# Patient Record
Sex: FEMALE | Race: WHITE | Employment: PART TIME | URBAN - NONMETROPOLITAN AREA
[De-identification: names, ages, dates, MRNs, and addresses within clinical notes are randomized per-mention and may not be internally consistent; named-entity substitution may affect disease eponyms.]

---

## 2017-02-06 RX ORDER — VALACYCLOVIR HYDROCHLORIDE 1 G/1
TABLET, FILM COATED ORAL
Qty: 30 TABLET | Refills: 0 | Status: SHIPPED | OUTPATIENT
Start: 2017-02-06 | End: 2017-03-09 | Stop reason: SDUPTHER

## 2017-03-09 RX ORDER — VALACYCLOVIR HYDROCHLORIDE 1 G/1
TABLET, FILM COATED ORAL
Qty: 30 TABLET | Refills: 0 | Status: SHIPPED | OUTPATIENT
Start: 2017-03-09 | End: 2017-06-13 | Stop reason: SDUPTHER

## 2017-06-13 ENCOUNTER — OFFICE VISIT (OUTPATIENT)
Dept: FAMILY MEDICINE CLINIC | Age: 47
End: 2017-06-13

## 2017-06-13 VITALS
OXYGEN SATURATION: 98 % | HEIGHT: 65 IN | HEART RATE: 90 BPM | DIASTOLIC BLOOD PRESSURE: 68 MMHG | WEIGHT: 191.8 LBS | BODY MASS INDEX: 31.96 KG/M2 | TEMPERATURE: 97.7 F | SYSTOLIC BLOOD PRESSURE: 98 MMHG

## 2017-06-13 DIAGNOSIS — F41.8 DEPRESSION WITH ANXIETY: Primary | ICD-10-CM

## 2017-06-13 DIAGNOSIS — Z12.39 BREAST CANCER SCREENING: ICD-10-CM

## 2017-06-13 DIAGNOSIS — F17.200 SMOKER: ICD-10-CM

## 2017-06-13 DIAGNOSIS — R63.5 WEIGHT GAIN: ICD-10-CM

## 2017-06-13 DIAGNOSIS — A60.00 GENITAL HERPES SIMPLEX, UNSPECIFIED SITE: ICD-10-CM

## 2017-06-13 DIAGNOSIS — F41.8 DEPRESSION WITH ANXIETY: ICD-10-CM

## 2017-06-13 LAB
ALT SERPL-CCNC: 32 U/L (ref 0–33)
ANION GAP SERPL CALCULATED.3IONS-SCNC: 17 MEQ/L (ref 7–13)
AST SERPL-CCNC: 33 U/L (ref 0–35)
BUN BLDV-MCNC: 11 MG/DL (ref 6–20)
CALCIUM SERPL-MCNC: 9.3 MG/DL (ref 8.6–10.2)
CHLORIDE BLD-SCNC: 98 MEQ/L (ref 98–107)
CO2: 20 MEQ/L (ref 22–29)
CREAT SERPL-MCNC: 0.48 MG/DL (ref 0.5–0.9)
GFR AFRICAN AMERICAN: >60
GFR NON-AFRICAN AMERICAN: >60
GLUCOSE BLD-MCNC: 71 MG/DL (ref 74–109)
HCT VFR BLD CALC: 44.7 % (ref 37–47)
HEMOGLOBIN: 14.7 G/DL (ref 12–16)
MCH RBC QN AUTO: 31.8 PG (ref 27–31.3)
MCHC RBC AUTO-ENTMCNC: 33 % (ref 33–37)
MCV RBC AUTO: 96.3 FL (ref 82–100)
PDW BLD-RTO: 14.3 % (ref 11.5–14.5)
PLATELET # BLD: 361 K/UL (ref 130–400)
POTASSIUM SERPL-SCNC: 4.5 MEQ/L (ref 3.5–5.1)
RBC # BLD: 4.64 M/UL (ref 4.2–5.4)
SODIUM BLD-SCNC: 135 MEQ/L (ref 132–144)
TSH SERPL DL<=0.05 MIU/L-ACNC: 1.28 UIU/ML (ref 0.27–4.2)
WBC # BLD: 7.1 K/UL (ref 4.8–10.8)

## 2017-06-13 PROCEDURE — 99203 OFFICE O/P NEW LOW 30 MIN: CPT | Performed by: NURSE PRACTITIONER

## 2017-06-13 RX ORDER — FLUOXETINE HYDROCHLORIDE 40 MG/1
CAPSULE ORAL
Qty: 60 CAPSULE | Refills: 5 | Status: SHIPPED | OUTPATIENT
Start: 2017-06-13 | End: 2017-07-11 | Stop reason: SDUPTHER

## 2017-06-13 RX ORDER — VALACYCLOVIR HYDROCHLORIDE 1 G/1
TABLET, FILM COATED ORAL
Qty: 30 TABLET | Refills: 5 | Status: SHIPPED | OUTPATIENT
Start: 2017-06-13 | End: 2017-07-11 | Stop reason: SDUPTHER

## 2017-06-13 RX ORDER — BUPROPION HYDROCHLORIDE 150 MG/1
150 TABLET, EXTENDED RELEASE ORAL 2 TIMES DAILY
Qty: 60 TABLET | Refills: 3 | Status: SHIPPED | OUTPATIENT
Start: 2017-06-13 | End: 2017-07-11 | Stop reason: SDUPTHER

## 2017-06-13 ASSESSMENT — ENCOUNTER SYMPTOMS
SHORTNESS OF BREATH: 0
COUGH: 0

## 2017-07-06 ENCOUNTER — HOSPITAL ENCOUNTER (OUTPATIENT)
Dept: WOMENS IMAGING | Age: 47
Discharge: HOME | End: 2017-07-06
Payer: COMMERCIAL

## 2017-07-06 DIAGNOSIS — Z12.39 BREAST CANCER SCREENING: ICD-10-CM

## 2017-07-06 PROCEDURE — G0202 SCR MAMMO BI INCL CAD: HCPCS

## 2017-07-11 ENCOUNTER — OFFICE VISIT (OUTPATIENT)
Dept: FAMILY MEDICINE CLINIC | Age: 47
End: 2017-07-11

## 2017-07-11 VITALS
OXYGEN SATURATION: 97 % | HEIGHT: 65 IN | DIASTOLIC BLOOD PRESSURE: 60 MMHG | BODY MASS INDEX: 31.32 KG/M2 | HEART RATE: 69 BPM | TEMPERATURE: 97.7 F | SYSTOLIC BLOOD PRESSURE: 98 MMHG | WEIGHT: 188 LBS

## 2017-07-11 DIAGNOSIS — F41.8 DEPRESSION WITH ANXIETY: ICD-10-CM

## 2017-07-11 DIAGNOSIS — F17.200 SMOKER: ICD-10-CM

## 2017-07-11 DIAGNOSIS — A60.00 GENITAL HERPES SIMPLEX, UNSPECIFIED SITE: ICD-10-CM

## 2017-07-11 PROCEDURE — 99213 OFFICE O/P EST LOW 20 MIN: CPT | Performed by: NURSE PRACTITIONER

## 2017-07-11 RX ORDER — VALACYCLOVIR HYDROCHLORIDE 1 G/1
TABLET, FILM COATED ORAL
Qty: 90 TABLET | Refills: 1 | Status: SHIPPED | OUTPATIENT
Start: 2017-07-11 | End: 2018-04-03 | Stop reason: SDUPTHER

## 2017-07-11 RX ORDER — BUPROPION HYDROCHLORIDE 150 MG/1
150 TABLET, EXTENDED RELEASE ORAL 2 TIMES DAILY
Qty: 180 TABLET | Refills: 1 | Status: SHIPPED | OUTPATIENT
Start: 2017-07-11 | End: 2018-01-24 | Stop reason: SDUPTHER

## 2017-07-11 RX ORDER — FLUOXETINE HYDROCHLORIDE 40 MG/1
CAPSULE ORAL
Qty: 180 CAPSULE | Refills: 1 | Status: SHIPPED | OUTPATIENT
Start: 2017-07-11 | End: 2018-01-24 | Stop reason: SDUPTHER

## 2017-07-11 ASSESSMENT — ENCOUNTER SYMPTOMS
SHORTNESS OF BREATH: 0
COUGH: 0

## 2018-01-24 DIAGNOSIS — F41.8 DEPRESSION WITH ANXIETY: ICD-10-CM

## 2018-01-24 DIAGNOSIS — F17.200 SMOKER: ICD-10-CM

## 2018-01-24 RX ORDER — BUPROPION HYDROCHLORIDE 150 MG/1
TABLET, EXTENDED RELEASE ORAL
Qty: 180 TABLET | Refills: 0 | Status: ON HOLD | OUTPATIENT
Start: 2018-01-24 | End: 2018-04-26

## 2018-01-24 RX ORDER — FLUOXETINE HYDROCHLORIDE 40 MG/1
CAPSULE ORAL
Qty: 180 CAPSULE | Refills: 0 | Status: SHIPPED | OUTPATIENT
Start: 2018-01-24 | End: 2018-04-03 | Stop reason: SDUPTHER

## 2018-04-03 ENCOUNTER — OFFICE VISIT (OUTPATIENT)
Dept: FAMILY MEDICINE CLINIC | Age: 48
End: 2018-04-03
Payer: COMMERCIAL

## 2018-04-03 VITALS
HEIGHT: 66 IN | RESPIRATION RATE: 14 BRPM | HEART RATE: 84 BPM | SYSTOLIC BLOOD PRESSURE: 118 MMHG | TEMPERATURE: 98.1 F | DIASTOLIC BLOOD PRESSURE: 72 MMHG | WEIGHT: 193 LBS | BODY MASS INDEX: 31.02 KG/M2

## 2018-04-03 DIAGNOSIS — A60.00 GENITAL HERPES SIMPLEX, UNSPECIFIED SITE: ICD-10-CM

## 2018-04-03 DIAGNOSIS — F41.8 DEPRESSION WITH ANXIETY: Primary | ICD-10-CM

## 2018-04-03 DIAGNOSIS — Z80.0 FAMILY HISTORY OF COLON CANCER REQUIRING SCREENING COLONOSCOPY: ICD-10-CM

## 2018-04-03 PROCEDURE — 99213 OFFICE O/P EST LOW 20 MIN: CPT | Performed by: FAMILY MEDICINE

## 2018-04-03 RX ORDER — VALACYCLOVIR HYDROCHLORIDE 1 G/1
TABLET, FILM COATED ORAL
Qty: 30 TABLET | Refills: 5 | Status: SHIPPED | OUTPATIENT
Start: 2018-04-03 | End: 2018-09-26 | Stop reason: SDUPTHER

## 2018-04-03 RX ORDER — FLUOXETINE HYDROCHLORIDE 40 MG/1
CAPSULE ORAL
Qty: 60 CAPSULE | Refills: 5 | Status: ON HOLD | OUTPATIENT
Start: 2018-04-03 | End: 2018-04-26

## 2018-04-03 RX ORDER — BUPROPION HYDROCHLORIDE 150 MG/1
TABLET, EXTENDED RELEASE ORAL
Qty: 60 TABLET | Refills: 5 | Status: CANCELLED | OUTPATIENT
Start: 2018-04-03

## 2018-04-03 ASSESSMENT — ENCOUNTER SYMPTOMS
ABDOMINAL PAIN: 0
SHORTNESS OF BREATH: 0

## 2018-04-09 ENCOUNTER — TELEPHONE (OUTPATIENT)
Dept: ENDOSCOPY | Age: 48
End: 2018-04-09

## 2018-04-25 ENCOUNTER — ANESTHESIA EVENT (OUTPATIENT)
Dept: ENDOSCOPY | Age: 48
End: 2018-04-25
Payer: COMMERCIAL

## 2018-04-26 ENCOUNTER — HOSPITAL ENCOUNTER (OUTPATIENT)
Age: 48
Setting detail: OUTPATIENT SURGERY
Discharge: HOME | End: 2018-04-26
Attending: SPECIALIST | Admitting: SPECIALIST
Payer: COMMERCIAL

## 2018-04-26 ENCOUNTER — ANESTHESIA (OUTPATIENT)
Dept: ENDOSCOPY | Age: 48
End: 2018-04-26
Payer: COMMERCIAL

## 2018-04-26 VITALS
DIASTOLIC BLOOD PRESSURE: 58 MMHG | SYSTOLIC BLOOD PRESSURE: 103 MMHG | RESPIRATION RATE: 7 BRPM | OXYGEN SATURATION: 97 %

## 2018-04-26 VITALS
HEART RATE: 55 BPM | SYSTOLIC BLOOD PRESSURE: 100 MMHG | WEIGHT: 185 LBS | BODY MASS INDEX: 30.82 KG/M2 | TEMPERATURE: 96.9 F | RESPIRATION RATE: 16 BRPM | HEIGHT: 65 IN | OXYGEN SATURATION: 99 % | DIASTOLIC BLOOD PRESSURE: 71 MMHG

## 2018-04-26 PROCEDURE — 3700000000 HC ANESTHESIA ATTENDED CARE: Performed by: SPECIALIST

## 2018-04-26 PROCEDURE — 3609027000 HC COLONOSCOPY: Performed by: SPECIALIST

## 2018-04-26 PROCEDURE — 7100000010 HC PHASE II RECOVERY - FIRST 15 MIN: Performed by: SPECIALIST

## 2018-04-26 PROCEDURE — 6360000002 HC RX W HCPCS: Performed by: NURSE ANESTHETIST, CERTIFIED REGISTERED

## 2018-04-26 PROCEDURE — 7100000011 HC PHASE II RECOVERY - ADDTL 15 MIN: Performed by: SPECIALIST

## 2018-04-26 PROCEDURE — 3700000001 HC ADD 15 MINUTES (ANESTHESIA): Performed by: SPECIALIST

## 2018-04-26 RX ORDER — PROPOFOL 10 MG/ML
INJECTION, EMULSION INTRAVENOUS PRN
Status: DISCONTINUED | OUTPATIENT
Start: 2018-04-26 | End: 2018-04-26 | Stop reason: SDUPTHER

## 2018-04-26 RX ORDER — VENLAFAXINE HYDROCHLORIDE 150 MG/1
150 CAPSULE, EXTENDED RELEASE ORAL DAILY
COMMUNITY
End: 2018-10-19 | Stop reason: SDUPTHER

## 2018-04-26 RX ORDER — SODIUM CHLORIDE 0.9 % (FLUSH) 0.9 %
10 SYRINGE (ML) INJECTION EVERY 12 HOURS SCHEDULED
Status: DISCONTINUED | OUTPATIENT
Start: 2018-04-26 | End: 2018-04-26 | Stop reason: HOSPADM

## 2018-04-26 RX ORDER — ONDANSETRON 2 MG/ML
4 INJECTION INTRAMUSCULAR; INTRAVENOUS
Status: DISCONTINUED | OUTPATIENT
Start: 2018-04-26 | End: 2018-04-26 | Stop reason: HOSPADM

## 2018-04-26 RX ORDER — SODIUM CHLORIDE 0.9 % (FLUSH) 0.9 %
10 SYRINGE (ML) INJECTION PRN
Status: DISCONTINUED | OUTPATIENT
Start: 2018-04-26 | End: 2018-04-26 | Stop reason: HOSPADM

## 2018-04-26 RX ORDER — SODIUM CHLORIDE 9 MG/ML
INJECTION, SOLUTION INTRAVENOUS CONTINUOUS
Status: DISCONTINUED | OUTPATIENT
Start: 2018-04-26 | End: 2018-04-26 | Stop reason: HOSPADM

## 2018-04-26 RX ORDER — TRAZODONE HYDROCHLORIDE 50 MG/1
50 TABLET ORAL DAILY PRN
COMMUNITY
End: 2020-04-28 | Stop reason: CLARIF

## 2018-04-26 RX ORDER — HYDROXYZINE PAMOATE 50 MG/1
50 CAPSULE ORAL 2 TIMES DAILY PRN
COMMUNITY
End: 2018-11-16 | Stop reason: SDUPTHER

## 2018-04-26 RX ORDER — LIDOCAINE HYDROCHLORIDE 10 MG/ML
1 INJECTION, SOLUTION EPIDURAL; INFILTRATION; INTRACAUDAL; PERINEURAL
Status: DISCONTINUED | OUTPATIENT
Start: 2018-04-26 | End: 2018-04-26 | Stop reason: HOSPADM

## 2018-04-26 RX ADMIN — PROPOFOL 50 MG: 10 INJECTION, EMULSION INTRAVENOUS at 07:12

## 2018-04-26 RX ADMIN — PROPOFOL 20 MG: 10 INJECTION, EMULSION INTRAVENOUS at 07:26

## 2018-04-26 RX ADMIN — PROPOFOL 50 MG: 10 INJECTION, EMULSION INTRAVENOUS at 07:07

## 2018-04-26 RX ADMIN — PROPOFOL 50 MG: 10 INJECTION, EMULSION INTRAVENOUS at 07:06

## 2018-04-26 RX ADMIN — PROPOFOL 50 MG: 10 INJECTION, EMULSION INTRAVENOUS at 07:18

## 2018-04-26 RX ADMIN — PROPOFOL 50 MG: 10 INJECTION, EMULSION INTRAVENOUS at 07:09

## 2018-04-26 RX ADMIN — PROPOFOL 10 MG: 10 INJECTION, EMULSION INTRAVENOUS at 07:24

## 2018-04-26 RX ADMIN — PROPOFOL 20 MG: 10 INJECTION, EMULSION INTRAVENOUS at 07:23

## 2018-04-26 RX ADMIN — PROPOFOL 50 MG: 10 INJECTION, EMULSION INTRAVENOUS at 07:16

## 2018-04-26 RX ADMIN — PROPOFOL 50 MG: 10 INJECTION, EMULSION INTRAVENOUS at 07:21

## 2018-09-21 ENCOUNTER — OFFICE VISIT (OUTPATIENT)
Dept: FAMILY MEDICINE CLINIC | Age: 48
End: 2018-09-21
Payer: COMMERCIAL

## 2018-09-21 VITALS
RESPIRATION RATE: 12 BRPM | DIASTOLIC BLOOD PRESSURE: 82 MMHG | HEART RATE: 60 BPM | BODY MASS INDEX: 32.52 KG/M2 | TEMPERATURE: 97.3 F | WEIGHT: 195.2 LBS | SYSTOLIC BLOOD PRESSURE: 120 MMHG | HEIGHT: 65 IN

## 2018-09-21 DIAGNOSIS — N92.6 IRREGULAR PERIODS: ICD-10-CM

## 2018-09-21 DIAGNOSIS — Z23 NEED FOR INFLUENZA VACCINATION: ICD-10-CM

## 2018-09-21 DIAGNOSIS — F32.A DEPRESSION, UNSPECIFIED DEPRESSION TYPE: ICD-10-CM

## 2018-09-21 DIAGNOSIS — Z12.31 VISIT FOR SCREENING MAMMOGRAM: ICD-10-CM

## 2018-09-21 DIAGNOSIS — Z00.00 ROUTINE GENERAL MEDICAL EXAMINATION AT A HEALTH CARE FACILITY: Primary | ICD-10-CM

## 2018-09-21 DIAGNOSIS — E66.09 OBESITY DUE TO EXCESS CALORIES, UNSPECIFIED CLASSIFICATION, UNSPECIFIED WHETHER SERIOUS COMORBIDITY PRESENT: ICD-10-CM

## 2018-09-21 DIAGNOSIS — A60.00 GENITAL HERPES SIMPLEX, UNSPECIFIED SITE: ICD-10-CM

## 2018-09-21 PROCEDURE — 99396 PREV VISIT EST AGE 40-64: CPT | Performed by: FAMILY MEDICINE

## 2018-09-21 PROCEDURE — 90688 IIV4 VACCINE SPLT 0.5 ML IM: CPT | Performed by: FAMILY MEDICINE

## 2018-09-21 PROCEDURE — 90471 IMMUNIZATION ADMIN: CPT | Performed by: FAMILY MEDICINE

## 2018-09-21 RX ORDER — PHENTERMINE HYDROCHLORIDE 37.5 MG/1
37.5 TABLET ORAL
Qty: 30 TABLET | Refills: 0 | Status: SHIPPED | OUTPATIENT
Start: 2018-09-21 | End: 2018-10-19 | Stop reason: SDUPTHER

## 2018-09-21 ASSESSMENT — PATIENT HEALTH QUESTIONNAIRE - PHQ9
SUM OF ALL RESPONSES TO PHQ QUESTIONS 1-9: 2
2. FEELING DOWN, DEPRESSED OR HOPELESS: 1
1. LITTLE INTEREST OR PLEASURE IN DOING THINGS: 1
SUM OF ALL RESPONSES TO PHQ QUESTIONS 1-9: 2
SUM OF ALL RESPONSES TO PHQ9 QUESTIONS 1 & 2: 2

## 2018-09-21 ASSESSMENT — ENCOUNTER SYMPTOMS
ABDOMINAL PAIN: 0
COUGH: 0
CONSTIPATION: 0
NAUSEA: 0
EYES NEGATIVE: 1
SHORTNESS OF BREATH: 0
DIARRHEA: 0

## 2018-09-21 NOTE — PROGRESS NOTES
Spouse name: Nadya Harvey    Number of children: N/A    Years of education: N/A     Occupational History     [employer]      Social History Main Topics    Smoking status: Former Smoker     Packs/day: 0.50     Years: 30.00     Types: Cigarettes     Start date: 1/1/1987     Quit date: 6/24/2017    Smokeless tobacco: Never Used    Alcohol use No      Comment: occasional    Drug use: No    Sexual activity: Not on file     Other Topics Concern    Not on file     Social History Narrative    No narrative on file     Family History   Problem Relation Age of Onset    Depression Mother     Cancer Mother         COLON    Colon Cancer Mother     Heart Attack Paternal Grandmother     Colon Cancer Paternal Grandfather      No Known Allergies  Current Outpatient Prescriptions   Medication Sig Dispense Refill    phentermine (ADIPEX-P) 37.5 MG tablet Take 1 tablet by mouth every morning (before breakfast) for 30 days. . 30 tablet 0    venlafaxine (EFFEXOR XR) 150 MG extended release capsule Take 150 mg by mouth daily      hydrOXYzine (VISTARIL) 50 MG capsule Take 50 mg by mouth 2 times daily as needed for Itching      traZODone (DESYREL) 50 MG tablet Take 50 mg by mouth daily as needed for Sleep      Multiple Vitamins-Minerals (MULTI ADULT GUMMIES PO) Take 1 tablet by mouth daily      valACYclovir (VALTREX) 1 g tablet TAKE 1 TABLET BY MOUTH ONCE DAILY 30 tablet 5     No current facility-administered medications for this visit. PMH, Surgical Hx, Family Hx, and Social Hx reviewed and updated. Health Maintenance reviewed. Objective    Vitals:    09/21/18 1339   BP: 120/82   Pulse: 60   Resp: 12   Temp: 97.3 °F (36.3 °C)   TempSrc: Temporal   Weight: 195 lb 3.2 oz (88.5 kg)   Height: 5' 5\" (1.651 m)     Physical Exam   Constitutional: She appears well-nourished. HENT:   Nose: Nose normal.   Mouth/Throat: Oropharynx is clear and moist.   Eyes: Pupils are equal, round, and reactive to light. Conjunctivae are normal.   Neck: Neck supple. No thyromegaly present. Cardiovascular: Normal rate, regular rhythm and normal heart sounds. No murmur heard. Pulmonary/Chest: Breath sounds normal. She has no wheezes. Abdominal: Soft. She exhibits no mass. There is no tenderness. Musculoskeletal: She exhibits no edema. Lymphadenopathy:     She has no cervical adenopathy. Neurological: She is alert. Skin: No rash noted. Psychiatric: She has a normal mood and affect. Assessment & Plan    Diagnosis Orders   1. Routine general medical examination at a health care facility  CBC With Auto Differential    Comprehensive Metabolic Panel    Lipid Panel   2. Depression, unspecified depression type     3. Genital herpes simplex, unspecified site     4. Need for influenza vaccination  INFLUENZA, QUADV, 3 YRS AND OLDER, IM, MDV, 0.5ML ([place] Patient)   5. Visit for screening mammogram  BONI DIGITAL SCREEN W CAD BILATERAL   6. Irregular periods  Ambulatory referral to Obstetrics / Gynecology   7.  Obesity due to excess calories, unspecified classification, unspecified whether serious comorbidity present  phentermine (ADIPEX-P) 37.5 MG tablet     Orders Placed This Encounter   Procedures    BONI DIGITAL SCREEN W CAD BILATERAL     Standing Status:   Future     Standing Expiration Date:   10/11/2019    INFLUENZA, QUADV, 3 YRS AND OLDER, IM, MDV, 0.5ML (FLUZONE QUADV)    CBC With Auto Differential     Standing Status:   Future     Standing Expiration Date:   9/21/2019    Comprehensive Metabolic Panel     Standing Status:   Future     Standing Expiration Date:   9/21/2019    Lipid Panel     Standing Status:   Future     Standing Expiration Date:   9/21/2019     Order Specific Question:   Is Patient Fasting?/# of Hours     Answer:   8    Ambulatory referral to Obstetrics / Gynecology     Referral Priority:   Routine     Referral Type:   Consult for Advice and Opinion     Referral Reason:   Specialty Services

## 2018-09-26 DIAGNOSIS — A60.00 GENITAL HERPES SIMPLEX, UNSPECIFIED SITE: ICD-10-CM

## 2018-09-26 RX ORDER — VALACYCLOVIR HYDROCHLORIDE 1 G/1
TABLET, FILM COATED ORAL
Qty: 30 TABLET | Refills: 5 | Status: SHIPPED | OUTPATIENT
Start: 2018-09-26 | End: 2019-04-03 | Stop reason: SDUPTHER

## 2018-10-19 ENCOUNTER — OFFICE VISIT (OUTPATIENT)
Dept: FAMILY MEDICINE CLINIC | Age: 48
End: 2018-10-19
Payer: COMMERCIAL

## 2018-10-19 VITALS
HEIGHT: 65 IN | RESPIRATION RATE: 12 BRPM | BODY MASS INDEX: 31.42 KG/M2 | WEIGHT: 188.6 LBS | DIASTOLIC BLOOD PRESSURE: 88 MMHG | TEMPERATURE: 97.1 F | SYSTOLIC BLOOD PRESSURE: 126 MMHG | HEART RATE: 52 BPM

## 2018-10-19 DIAGNOSIS — M50.30 DDD (DEGENERATIVE DISC DISEASE), CERVICAL: ICD-10-CM

## 2018-10-19 DIAGNOSIS — E66.09 OBESITY DUE TO EXCESS CALORIES, UNSPECIFIED CLASSIFICATION, UNSPECIFIED WHETHER SERIOUS COMORBIDITY PRESENT: Primary | ICD-10-CM

## 2018-10-19 DIAGNOSIS — F32.A DEPRESSION, UNSPECIFIED DEPRESSION TYPE: ICD-10-CM

## 2018-10-19 PROCEDURE — 99213 OFFICE O/P EST LOW 20 MIN: CPT | Performed by: FAMILY MEDICINE

## 2018-10-19 RX ORDER — PHENTERMINE HYDROCHLORIDE 37.5 MG/1
37.5 TABLET ORAL
Qty: 30 TABLET | Refills: 0 | Status: SHIPPED | OUTPATIENT
Start: 2018-10-19 | End: 2018-11-16 | Stop reason: SDUPTHER

## 2018-10-19 RX ORDER — VENLAFAXINE HYDROCHLORIDE 150 MG/1
150 CAPSULE, EXTENDED RELEASE ORAL DAILY
Qty: 30 CAPSULE | Refills: 5 | Status: SHIPPED | OUTPATIENT
Start: 2018-10-19 | End: 2018-11-16 | Stop reason: SDUPTHER

## 2018-10-19 ASSESSMENT — ENCOUNTER SYMPTOMS
CONSTIPATION: 0
NAUSEA: 0
COUGH: 0
EYES NEGATIVE: 1
ABDOMINAL PAIN: 0
SHORTNESS OF BREATH: 0
DIARRHEA: 0

## 2018-11-16 ENCOUNTER — OFFICE VISIT (OUTPATIENT)
Dept: FAMILY MEDICINE CLINIC | Age: 48
End: 2018-11-16
Payer: COMMERCIAL

## 2018-11-16 VITALS
WEIGHT: 183 LBS | BODY MASS INDEX: 30.49 KG/M2 | HEIGHT: 65 IN | TEMPERATURE: 96.3 F | SYSTOLIC BLOOD PRESSURE: 120 MMHG | HEART RATE: 76 BPM | RESPIRATION RATE: 12 BRPM | DIASTOLIC BLOOD PRESSURE: 78 MMHG

## 2018-11-16 DIAGNOSIS — Z00.00 ROUTINE GENERAL MEDICAL EXAMINATION AT A HEALTH CARE FACILITY: ICD-10-CM

## 2018-11-16 DIAGNOSIS — M50.30 DDD (DEGENERATIVE DISC DISEASE), CERVICAL: Primary | ICD-10-CM

## 2018-11-16 DIAGNOSIS — E66.09 OBESITY DUE TO EXCESS CALORIES, UNSPECIFIED CLASSIFICATION, UNSPECIFIED WHETHER SERIOUS COMORBIDITY PRESENT: ICD-10-CM

## 2018-11-16 DIAGNOSIS — F32.A MILD DEPRESSION: ICD-10-CM

## 2018-11-16 LAB
ANION GAP SERPL CALCULATED.3IONS-SCNC: 11 MEQ/L (ref 7–13)
BASOPHILS ABSOLUTE: 0.1 K/UL (ref 0–0.2)
BASOPHILS RELATIVE PERCENT: 1.1 %
BUN BLDV-MCNC: 17 MG/DL (ref 6–20)
CALCIUM SERPL-MCNC: 10.2 MG/DL (ref 8.6–10.2)
CHLORIDE BLD-SCNC: 107 MEQ/L (ref 98–107)
CHOLESTEROL, TOTAL: 127 MG/DL (ref 0–199)
CO2: 26 MEQ/L (ref 22–29)
CREAT SERPL-MCNC: 0.62 MG/DL (ref 0.5–0.9)
EOSINOPHILS ABSOLUTE: 0.1 K/UL (ref 0–0.7)
EOSINOPHILS RELATIVE PERCENT: 2.1 %
GFR AFRICAN AMERICAN: >60
GFR NON-AFRICAN AMERICAN: >60
GLUCOSE BLD-MCNC: 104 MG/DL (ref 74–109)
HCT VFR BLD CALC: 42.7 % (ref 37–47)
HDLC SERPL-MCNC: 43 MG/DL (ref 40–59)
HEMOGLOBIN: 14.8 G/DL (ref 12–16)
LDL CHOLESTEROL CALCULATED: 74 MG/DL (ref 0–129)
LYMPHOCYTES ABSOLUTE: 1.5 K/UL (ref 1–4.8)
LYMPHOCYTES RELATIVE PERCENT: 22.6 %
MCH RBC QN AUTO: 33.3 PG (ref 27–31.3)
MCHC RBC AUTO-ENTMCNC: 34.7 % (ref 33–37)
MCV RBC AUTO: 95.9 FL (ref 82–100)
MONOCYTES ABSOLUTE: 0.5 K/UL (ref 0.2–0.8)
MONOCYTES RELATIVE PERCENT: 7.1 %
NEUTROPHILS ABSOLUTE: 4.3 K/UL (ref 1.4–6.5)
NEUTROPHILS RELATIVE PERCENT: 67.1 %
PDW BLD-RTO: 13.5 % (ref 11.5–14.5)
PLATELET # BLD: 338 K/UL (ref 130–400)
POTASSIUM SERPL-SCNC: 4.4 MEQ/L (ref 3.5–5.1)
RBC # BLD: 4.46 M/UL (ref 4.2–5.4)
SODIUM BLD-SCNC: 144 MEQ/L (ref 132–144)
TRIGL SERPL-MCNC: 48 MG/DL (ref 0–200)
WBC # BLD: 6.4 K/UL (ref 4.8–10.8)

## 2018-11-16 PROCEDURE — 99213 OFFICE O/P EST LOW 20 MIN: CPT | Performed by: FAMILY MEDICINE

## 2018-11-16 RX ORDER — HYDROXYZINE PAMOATE 50 MG/1
50 CAPSULE ORAL 2 TIMES DAILY PRN
Qty: 60 CAPSULE | Refills: 5 | Status: SHIPPED | OUTPATIENT
Start: 2018-11-16 | End: 2020-04-28 | Stop reason: CLARIF

## 2018-11-16 RX ORDER — PHENTERMINE HYDROCHLORIDE 37.5 MG/1
37.5 TABLET ORAL
Qty: 30 TABLET | Refills: 0 | Status: SHIPPED | OUTPATIENT
Start: 2018-11-16 | End: 2018-12-16

## 2018-11-16 RX ORDER — VENLAFAXINE HYDROCHLORIDE 150 MG/1
150 CAPSULE, EXTENDED RELEASE ORAL DAILY
Qty: 30 CAPSULE | Refills: 5 | Status: SHIPPED | OUTPATIENT
Start: 2018-11-16 | End: 2019-10-23 | Stop reason: DRUGHIGH

## 2018-11-16 ASSESSMENT — ENCOUNTER SYMPTOMS
DIARRHEA: 0
EYES NEGATIVE: 1
SHORTNESS OF BREATH: 0
CONSTIPATION: 0
COUGH: 0
NAUSEA: 0
ABDOMINAL PAIN: 0

## 2019-06-18 ENCOUNTER — TELEPHONE (OUTPATIENT)
Dept: ADMINISTRATIVE | Age: 49
End: 2019-06-18

## 2019-06-18 NOTE — TELEPHONE ENCOUNTER
Spoke with patient. The Pt called to share that she will be contacting  [email or web address] for F/Up Info. Then based on the New updated info-The Pt will call back to let [facility] ONEAL LEBLANC know if she will or will not be following Dr Jonathon Cortes to [facility]. The Pt was given the Healthcare Providers Names at the   [facility] Address to check for Ins   Coverage Issues.

## 2019-09-30 DIAGNOSIS — A60.00 GENITAL HERPES SIMPLEX, UNSPECIFIED SITE: ICD-10-CM

## 2019-10-01 RX ORDER — VALACYCLOVIR HYDROCHLORIDE 1 G/1
TABLET, FILM COATED ORAL
Qty: 30 TABLET | Refills: 5 | OUTPATIENT
Start: 2019-10-01

## 2019-10-18 DIAGNOSIS — A60.00 GENITAL HERPES SIMPLEX, UNSPECIFIED SITE: ICD-10-CM

## 2019-10-18 RX ORDER — VALACYCLOVIR HYDROCHLORIDE 1 G/1
TABLET, FILM COATED ORAL
Qty: 7 TABLET | Refills: 0 | Status: SHIPPED | OUTPATIENT
Start: 2019-10-18 | End: 2019-10-23 | Stop reason: ALTCHOICE

## 2019-10-23 ENCOUNTER — OFFICE VISIT (OUTPATIENT)
Dept: FAMILY MEDICINE CLINIC | Age: 49
End: 2019-10-23
Payer: COMMERCIAL

## 2019-10-23 VITALS
SYSTOLIC BLOOD PRESSURE: 106 MMHG | HEIGHT: 65 IN | WEIGHT: 149 LBS | HEART RATE: 80 BPM | OXYGEN SATURATION: 97 % | DIASTOLIC BLOOD PRESSURE: 64 MMHG | TEMPERATURE: 98.3 F | BODY MASS INDEX: 24.83 KG/M2 | RESPIRATION RATE: 15 BRPM

## 2019-10-23 DIAGNOSIS — Z12.4 SCREENING FOR CERVICAL CANCER: ICD-10-CM

## 2019-10-23 DIAGNOSIS — Z12.31 VISIT FOR SCREENING MAMMOGRAM: ICD-10-CM

## 2019-10-23 DIAGNOSIS — F32.A DEPRESSION, UNSPECIFIED DEPRESSION TYPE: ICD-10-CM

## 2019-10-23 DIAGNOSIS — F41.9 ANXIETY: ICD-10-CM

## 2019-10-23 DIAGNOSIS — F51.01 PRIMARY INSOMNIA: ICD-10-CM

## 2019-10-23 DIAGNOSIS — Z12.39 SCREENING FOR BREAST CANCER: Primary | ICD-10-CM

## 2019-10-23 PROCEDURE — 99214 OFFICE O/P EST MOD 30 MIN: CPT | Performed by: INTERNAL MEDICINE

## 2019-10-23 RX ORDER — VALACYCLOVIR HYDROCHLORIDE 1 G/1
1000 TABLET, FILM COATED ORAL 2 TIMES DAILY
COMMUNITY
End: 2019-10-23 | Stop reason: DRUGHIGH

## 2019-10-23 RX ORDER — HYDROXYZINE 50 MG/1
50 TABLET, FILM COATED ORAL DAILY PRN
Qty: 90 TABLET | Refills: 1 | Status: SHIPPED | OUTPATIENT
Start: 2019-10-23 | End: 2020-04-27

## 2019-10-23 RX ORDER — VENLAFAXINE HYDROCHLORIDE 150 MG/1
150 CAPSULE, EXTENDED RELEASE ORAL DAILY
Qty: 90 CAPSULE | Refills: 1 | Status: SHIPPED | OUTPATIENT
Start: 2019-10-23 | End: 2020-04-28 | Stop reason: SDUPTHER

## 2019-10-23 RX ORDER — VALACYCLOVIR HYDROCHLORIDE 1 G/1
1000 TABLET, FILM COATED ORAL DAILY
Qty: 90 TABLET | Refills: 3 | Status: SHIPPED | OUTPATIENT
Start: 2019-10-23 | End: 2020-04-28 | Stop reason: SDUPTHER

## 2019-10-23 ASSESSMENT — ENCOUNTER SYMPTOMS
SHORTNESS OF BREATH: 0
EYE PAIN: 0
ABDOMINAL PAIN: 0
BACK PAIN: 0

## 2020-04-23 ENCOUNTER — TELEPHONE (OUTPATIENT)
Dept: FAMILY MEDICINE CLINIC | Age: 50
End: 2020-04-23

## 2020-04-27 RX ORDER — HYDROXYZINE 50 MG/1
TABLET, FILM COATED ORAL
Qty: 30 TABLET | Refills: 0 | Status: SHIPPED | OUTPATIENT
Start: 2020-04-27 | End: 2020-04-28 | Stop reason: SDUPTHER

## 2020-04-28 ENCOUNTER — VIRTUAL VISIT (OUTPATIENT)
Dept: FAMILY MEDICINE CLINIC | Age: 50
End: 2020-04-28
Payer: COMMERCIAL

## 2020-04-28 PROCEDURE — 99442 PR PHYS/QHP TELEPHONE EVALUATION 11-20 MIN: CPT | Performed by: INTERNAL MEDICINE

## 2020-04-28 RX ORDER — VENLAFAXINE HYDROCHLORIDE 150 MG/1
150 CAPSULE, EXTENDED RELEASE ORAL DAILY
Qty: 90 CAPSULE | Refills: 1 | Status: SHIPPED | OUTPATIENT
Start: 2020-04-28 | End: 2020-10-28 | Stop reason: SDUPTHER

## 2020-04-28 RX ORDER — VALACYCLOVIR HYDROCHLORIDE 1 G/1
1000 TABLET, FILM COATED ORAL DAILY
Qty: 90 TABLET | Refills: 3 | Status: SHIPPED | OUTPATIENT
Start: 2020-04-28 | End: 2020-10-23

## 2020-04-28 RX ORDER — HYDROXYZINE 50 MG/1
TABLET, FILM COATED ORAL
Qty: 135 TABLET | Refills: 1 | Status: SHIPPED | OUTPATIENT
Start: 2020-04-28 | End: 2020-10-28 | Stop reason: SDUPTHER

## 2020-04-28 ASSESSMENT — ENCOUNTER SYMPTOMS
BACK PAIN: 0
EYE PAIN: 0
SHORTNESS OF BREATH: 0
ABDOMINAL PAIN: 0

## 2020-04-28 NOTE — PATIENT INSTRUCTIONS
Up Now\" link. Current as of: August 21, 2019Content Version: 12.4  © 8829-3918 [employer]. Care instructions adapted under license by [facility] ([facility]). If you have questions about a medical condition or this instruction, always ask your healthcare professional. [address] any warranty or liability for your use of this information.

## 2020-05-20 NOTE — PROGRESS NOTES
file    Intimate partner violence     Fear of current or ex partner: Not on file     Emotionally abused: Not on file     Physically abused: Not on file     Forced sexual activity: Not on file   Other Topics Concern    Not on file   Social History Narrative    Not on file     No Known Allergies  Current Outpatient Medications on File Prior to Visit   Medication Sig Dispense Refill    Multiple Vitamins-Minerals (MULTI ADULT GUMMIES PO) Take 1 tablet by mouth daily       No current facility-administered medications on file prior to visit. I have personally reviewed the ROS, PMH, PFH, and social history     Review of Systems   Constitutional: Negative for chills and fever. HENT: Negative for congestion. Eyes: Negative for pain. Respiratory: Negative for shortness of breath. Cardiovascular: Negative for chest pain. Gastrointestinal: Negative for abdominal pain. Genitourinary: Negative for hematuria. Musculoskeletal: Negative for back pain. Allergic/Immunologic: Negative for immunocompromised state. Neurological: Negative for headaches. Psychiatric/Behavioral: Negative for hallucinations and sleep disturbance. The patient is nervous/anxious. Objective: There were no vitals taken for this visit. Physical Exam    Unable to perform given telephone visit   Assessment:       Diagnosis Orders   1. Anxiety     2. Depression, unspecified depression type     3. History of herpes genitalis     4. Screening for breast cancer  BONI DIGITAL SCREEN W CAD BILATERAL   5. Screening for ovarian cancer  Ambulatory referral to Obstetrics / Gynecology   6. Screening for cervical cancer  Ambulatory referral to Obstetrics / Gynecology         Plan:   Telephone visit done because of coronavirus pandemic. Time spent 13 minutes. explained billeable visit, patient understands and agrees to continue  I was at home and patient was at home during this visit.    Refused HIV screen   Discuss tdap at next visit per her preference. Mammogram and ob/gyn referral sent. Labs after pandemic. Doing well on Effexor  No SI/HI  Does well on vistaril but does it daily  Wants to stay on both of them. F/u in 6 months, sooner should any issues arise. Orders Placed This Encounter   Procedures    BONI DIGITAL SCREEN W CAD BILATERAL     Standing Status:   Future     Standing Expiration Date:   6/28/2021     Order Specific Question:   Reason for exam:     Answer:   screening    Ambulatory referral to Obstetrics / Gynecology     Referral Priority:   Routine     Referral Type:   Eval and Treat     Referral Reason:   Specialty Services Required     Referred to Provider:   Laine Stokes DO     Requested Specialty:   Obstetrics & Gynecology     Number of Visits Requested:   1     Orders Placed This Encounter   Medications    venlafaxine (EFFEXOR XR) 150 MG extended release capsule     Sig: Take 1 capsule by mouth daily     Dispense:  90 capsule     Refill:  1    valACYclovir (VALTREX) 1 g tablet     Sig: Take 1 tablet by mouth daily     Dispense:  90 tablet     Refill:  3    hydrOXYzine (ATARAX) 50 MG tablet     Sig: Take one tablet po once daily and then up to one additional tablet per day, wait at least 6 hours between tablets. Max 2 tabs per day     Dispense:  135 tablet     Refill:  1       No follow-ups on file. Booker Brown MD    If anything should change or worsen call ASAP, don't wait for next scheduled appointment. Provider exam

## 2020-10-23 RX ORDER — VALACYCLOVIR HYDROCHLORIDE 1 G/1
TABLET, FILM COATED ORAL
Qty: 30 TABLET | Refills: 0 | Status: SHIPPED | OUTPATIENT
Start: 2020-10-23 | End: 2020-10-28 | Stop reason: SDUPTHER

## 2020-10-28 ENCOUNTER — OFFICE VISIT (OUTPATIENT)
Dept: FAMILY MEDICINE CLINIC | Age: 50
End: 2020-10-28
Payer: COMMERCIAL

## 2020-10-28 VITALS
OXYGEN SATURATION: 98 % | HEIGHT: 65 IN | RESPIRATION RATE: 16 BRPM | DIASTOLIC BLOOD PRESSURE: 70 MMHG | HEART RATE: 92 BPM | WEIGHT: 149 LBS | BODY MASS INDEX: 24.83 KG/M2 | SYSTOLIC BLOOD PRESSURE: 110 MMHG | TEMPERATURE: 98 F

## 2020-10-28 PROCEDURE — 99214 OFFICE O/P EST MOD 30 MIN: CPT | Performed by: INTERNAL MEDICINE

## 2020-10-28 RX ORDER — HYDROXYZINE 50 MG/1
TABLET, FILM COATED ORAL
Qty: 180 TABLET | Refills: 1 | Status: SHIPPED | OUTPATIENT
Start: 2020-10-28 | End: 2021-05-06 | Stop reason: SDUPTHER

## 2020-10-28 RX ORDER — VALACYCLOVIR HYDROCHLORIDE 1 G/1
TABLET, FILM COATED ORAL
Qty: 90 TABLET | Refills: 3 | Status: SHIPPED | OUTPATIENT
Start: 2020-10-28 | End: 2021-11-19

## 2020-10-28 RX ORDER — VENLAFAXINE HYDROCHLORIDE 150 MG/1
150 CAPSULE, EXTENDED RELEASE ORAL DAILY
Qty: 90 CAPSULE | Refills: 1 | Status: SHIPPED | OUTPATIENT
Start: 2020-10-28 | End: 2021-06-07

## 2020-10-28 ASSESSMENT — ENCOUNTER SYMPTOMS
SHORTNESS OF BREATH: 0
ABDOMINAL PAIN: 0
BACK PAIN: 0
EYE PAIN: 0

## 2020-10-28 NOTE — PROGRESS NOTES
Subjective:      Patient ID: Janie Marcos is a 48 y.o. female who presents today with:  Chief Complaint   Patient presents with    Depression     6 months f/u       HPI   NO SE  NO SI/HI  Compliant.    History of genital herpes  Anxiety and depression  Chronic  No side effects  No recent herpes lesions  Doing well on valtrex  Past Medical History:   Diagnosis Date    Depression     Genital herpes      Past Surgical History:   Procedure Laterality Date    CERVICAL DISCECTOMY      HAND SURGERY Right     plate in her right hand    IA COLON CA SCRN NOT HI RSK IND N/A 4/26/2018    COLONOSCOPY performed by Aurelia Martell MD at [place] [address] Marital status:      Spouse name: Livan Stager    Number of children: Not on file    Years of education: Not on file    Highest education level: Not on file   Occupational History     Employer: [address] [place] [address]    Social Needs    Financial resource strain: Not on file    Food insecurity     Worry: Not on file     Inability: Not on file   [employer] needs     Medical: Not on file     Non-medical: Not on file   Tobacco Use    Smoking status: Former Smoker     Packs/day: 0.50     Years: 30.00     Pack years: 15.00     Types: Cigarettes     Start date: 1/1/1987     Last attempt to quit: 6/24/2017     Years since quitting: 3.3    Smokeless tobacco: Never Used   Substance and Sexual Activity    Alcohol use: No     Comment: occasional    Drug use: No    Sexual activity: Not on file   Lifestyle    Physical activity     Days per week: Not on file     Minutes per session: Not on file    Stress: Not on file   Relationships    Social connections     Talks on phone: Not on file     Gets together: Not on file     Attends Taoist service: Not on file     Active member of club or organization: Not on file     Attends meetings of clubs or organizations: Not on file Abdominal:      General: Bowel sounds are normal. There is no distension. Palpations: Abdomen is soft. Tenderness: There is no abdominal tenderness. There is no right CVA tenderness, left CVA tenderness, guarding or rebound. Musculoskeletal:      Right lower leg: No edema. Left lower leg: No edema. Skin:     General: Skin is warm and dry. Neurological:      Mental Status: She is oriented to person, place, and time. Assessment:       Diagnosis Orders   1. Anxiety     2. Depression, unspecified depression type     3. Family history of herpes genitalis     4. Screening for ovarian cancer  Ambulatory referral to Obstetrics / Gynecology   5. Screening for cervical cancer  Ambulatory referral to Obstetrics / Gynecology   6. Thyroid nodule  US HEAD NECK SOFT TISSUE THYROID         Plan:    vc  Mammogram and ob/gyn referral sent. Continue chronic medications. Doing well on Effexor  Does well on vistaril but does it daily  Thyroid US suspected nodule on right. Risk of cancer. Orders Placed This Encounter   Procedures    US HEAD NECK SOFT TISSUE THYROID     Standing Status:   Future     Standing Expiration Date:   10/28/2021    Ambulatory referral to Obstetrics / Gynecology     Referral Priority:   Routine     Referral Type:   Eval and Treat     Referral Reason:   Specialty Services Required     Referred to Provider:   Nicole Dietrich DO     Requested Specialty:   Obstetrics & Gynecology     Number of Visits Requested:   1     Orders Placed This Encounter   Medications    venlafaxine (EFFEXOR XR) 150 MG extended release capsule     Sig: Take 1 capsule by mouth daily     Dispense:  90 capsule     Refill:  1    hydrOXYzine (ATARAX) 50 MG tablet     Sig: Take one tablet po once daily and then up to one additional tablet per day, wait at least 6 hours between tablets.  Max 2 tabs per day     Dispense:  180 tablet     Refill:  1    valACYclovir (VALTREX) 1 g tablet     Sig: Take 1 tablet by mouth once daily     Dispense:  90 tablet     Refill:  3   had flu shot 2020   Refused tdap   Will get labs  Refused HIV screen   Patient was offered pregnancy test, she declined, she understands risks of birth defects. If anything should change or worsen call ASAP, don't wait for next scheduled appointment. Return in about 6 months (around 4/28/2021) for Chronic condition management/appointment, worsening symptoms, call ASAP for appointment.       Lulú Hernandez MD

## 2020-10-28 NOTE — PATIENT INSTRUCTIONS
Patient Education        venlafaxine  Pronunciation:  MEE la fax een  Brand:  Effexor XR  What is the most important information I should know about venlafaxine? Do not use venlafaxine within 7 days before or 14 days after you have used an MAO inhibitor, such as isocarboxazid, linezolid, methylene blue injection, phenelzine, rasagiline, selegiline, or tranylcypromine. Some young people have thoughts about suicide when first taking an antidepressant. Stay alert to changes in your mood or symptoms. Report any new or worsening symptoms to your doctor. Do not stop using venlafaxine without first talking to your doctor. What is venlafaxine? Venlafaxine is a selective serotonin and norepinephrine reuptake inhibitor (SNRIs) antidepressant. Venlafaxine affects chemicals in the brain that may be unbalanced in people with depression. Venlafaxine is used to treat major depressive disorder, anxiety, and panic disorder. Venlafaxine may also be used for purposes not listed in this medication guide. What should I discuss with my healthcare provider before taking venlafaxine? You should not take this medicine if you are allergic to venlafaxine or desvenlafaxine (Pristiq). Do not use venlafaxine within 7 days before or 14 days after you have used an MAO inhibitor, such as isocarboxazid, linezolid, methylene blue injection, phenelzine, rasagiline, selegiline, or tranylcypromine. A dangerous drug interaction could occur. Some medicines can interact with venlafaxine and cause a serious condition called serotonin syndrome. Be sure your doctor knows if you also take stimulant medicine, opioid medicine, herbal products, or medicine for depression, mental illness, Parkinson's disease, migraine headaches, serious infections, or prevention of nausea and vomiting. Ask your doctor before making any changes in how or when you take your medications.    Tell your doctor if you have ever had:  · bipolar disorder (manic depression);  · cirrhosis or other liver disease;  · kidney disease;  · heart disease, high blood pressure, high cholesterol;  · diabetes;  · narrow-angle glaucoma;  · a thyroid disorder;  · a history of seizures;  · a bleeding or blood clotting disorder;  · low levels of sodium in your blood; or  · if you are switching to venlafaxine from another antidepressant. Some young people have thoughts about suicide when first taking an antidepressant. Your doctor should check your progress at regular visits. Your family or other caregivers should also be alert to changes in your mood or symptoms. Venlafaxine may cause serious lung problems in a  if the mother takes the medicine late in pregnancy (during the third trimester). However, you may have a relapse of depression if you stop taking your antidepressant. Tell your doctor right away if you become pregnant. Do not start or stop taking venlafaxine during pregnancy without your doctor's advice. You should not breast-feed while using this medicine. Venlafaxine is not approved for use by anyone younger than 25years old. How should I take venlafaxine? Follow all directions on your prescription label and read all medication guides or instruction sheets. Use the medicine exactly as directed. Venlafaxine should be taken with food. Try to take venlafaxine at the same time each day. Swallow the extended-release capsule or tablet whole and do not crush, chew, break, or open it. If you cannot swallow a capsule whole, open it and sprinkle the medicine into a spoonful of applesauce. Swallow the mixture right away without chewing. Do not save it for later use. It may take several weeks before your symptoms improve. Keep using the medication as directed. Do not stop using venlafaxine without first talking to your doctor. You may have unpleasant side effects if you stop taking this medicine suddenly. Your blood pressure will need to be checked often.   This medicine may affect a drug-screening urine test and you may have false results. Tell the laboratory staff that you use venlafaxine. Store at room temperature away from moisture and heat. What happens if I miss a dose? Take the medicine as soon as you can, but skip the missed dose if it is almost time for your next dose. Do not take two doses at one time. What happens if I overdose? Seek emergency medical attention or call the Poison Help line at 1-478.722.5499. What should I avoid while taking venlafaxine? Drinking alcohol with this medicine can cause side effects. Ask your doctor before taking a nonsteroidal anti-inflammatory drug (NSAID) such as aspirin, ibuprofen (Advil, Motrin), naproxen (Aleve), celecoxib (Celebrex), diclofenac, indomethacin, meloxicam, and others. Using an NSAID with venlafaxine may cause you to bruise or bleed easily. Avoid driving or hazardous activity until you know how this medicine will affect you. Your reactions could be impaired. What are the possible side effects of venlafaxine? Get emergency medical help if you have signs of an allergic reaction: skin rash or hives; difficulty breathing; swelling of your face, lips, tongue, or throat. Report any new or worsening symptoms to your doctor, such as: mood or behavior changes, anxiety, panic attacks, trouble sleeping, or if you feel impulsive, irritable, agitated, hostile, aggressive, restless, hyperactive (mentally or physically), more depressed, or have thoughts about suicide or hurting yourself.   Call your doctor at once if you have:  · blurred vision, tunnel vision, eye pain or swelling, or seeing halos around lights;  · easy bruising or bleeding (nosebleeds, bleeding gums), blood in your urine or stools, coughing up blood;  · cough, chest tightness, trouble breathing;  · a seizure (convulsions);  · low sodium level  --headache, confusion, slurred speech, severe weakness, vomiting, loss of coordination, feeling unsteady; or  · severe nervous system reaction --very stiff (rigid) muscles, high fever, sweating, confusion, fast or uneven heartbeats, tremors, feeling like you might pass out. Seek medical attention right away if you have symptoms of serotonin syndrome, such as: agitation, hallucinations, fever, sweating, shivering, fast heart rate, muscle stiffness, twitching, loss of coordination, nausea, vomiting, or diarrhea  Common side effects may include:  · dizziness, drowsiness,  · anxiety, feeling nervous;  · sleep problems (insomnia);  · vision changes;  · nausea, vomiting, diarrhea;  · changes in weight or appetite;  · dry mouth, yawning;  · increased sweating; or  · decreased sex drive, impotence, abnormal ejaculation, difficulty having an orgasm. This is not a complete list of side effects and others may occur. Call your doctor for medical advice about side effects. You may report side effects to FDA at 6-157-FDA-9661. What other drugs will affect venlafaxine? Using venlafaxine with other drugs that make you drowsy can worsen this effect. Ask your doctor before using opioid medication, a sleeping pill, a muscle relaxer, or medicine for anxiety or seizures. Tell your doctor about all your current medicines. Many drugs can affect venlafaxine, especially:  · any other antidepressant;  · cimetidine;  · Ettrick's wort;  · tramadol;  · tryptophan (sometimes called L-tryptophan);  · a blood thinner --warfarin, Coumadin, Jantoven;  · medicine to treat mood disorders, thought disorders, or mental illness --buspirone, lithium, and many others; or  · migraine headache medicine --sumatriptan, zolmitriptan, and others. This list is not complete and many other drugs may affect venlafaxine. This includes prescription and over-the-counter medicines, vitamins, and herbal products. Not all possible drug interactions are listed here. Where can I get more information? Your pharmacist can provide more information about venlafaxine.   Remember, keep this and all other medicines out of the reach of children, never share your medicines with others, and use this medication only for the indication prescribed. Every effort has been made to ensure that the information provided by [address] is accurate, up-to-date, and complete, but no guarantee is made to that effect. Drug information contained herein may be time sensitive. [facility] information has been compiled for use by healthcare practitioners and consumers in the [place] and therefore [facility] does not warrant that uses outside of the [place] are appropriate, unless specifically indicated otherwise. [facility]'s drug information does not endorse drugs, diagnose patients or recommend therapy. [facility]'s drug information is an informational resource designed to assist licensed healthcare practitioners in caring for their patients and/or to serve consumers viewing this service as a supplement to, and not a substitute for, the expertise, skill, knowledge and judgment of healthcare practitioners. The absence of a warning for a given drug or drug combination in no way should be construed to indicate that the drug or drug combination is safe, effective or appropriate for any given patient. [facility] does not assume any responsibility for any aspect of healthcare administered with the aid of information [facility] provides. The information contained herein is not intended to cover all possible uses, directions, precautions, warnings, drug interactions, allergic reactions, or adverse effects. If you have questions about the drugs you are taking, check with your doctor, nurse or pharmacist.  Copyright 5157-2624 [address]: 15.01. Revision date: 5/4/2018. Care instructions adapted under license by [facility] ([facility]).  If you have questions about a medical condition or this instruction, always ask your healthcare professional. [place] [address] any warranty or liability for your use of this information.

## 2020-11-04 ENCOUNTER — HOSPITAL ENCOUNTER (OUTPATIENT)
Dept: WOMENS IMAGING | Age: 50
Discharge: HOME | End: 2020-11-06
Payer: COMMERCIAL

## 2020-11-04 PROCEDURE — 77063 BREAST TOMOSYNTHESIS BI: CPT

## 2020-11-12 ENCOUNTER — HOSPITAL ENCOUNTER (OUTPATIENT)
Dept: ULTRASOUND IMAGING | Age: 50
Discharge: HOME | End: 2020-11-14
Payer: COMMERCIAL

## 2020-11-12 PROCEDURE — 76536 US EXAM OF HEAD AND NECK: CPT
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
## 2021-01-22 ENCOUNTER — OFFICE VISIT (OUTPATIENT)
Dept: OBGYN CLINIC | Age: 51
End: 2021-01-22
Payer: COMMERCIAL

## 2021-01-22 VITALS
SYSTOLIC BLOOD PRESSURE: 112 MMHG | WEIGHT: 157 LBS | BODY MASS INDEX: 25.23 KG/M2 | HEIGHT: 66 IN | DIASTOLIC BLOOD PRESSURE: 72 MMHG

## 2021-01-22 DIAGNOSIS — Z11.3 SCREEN FOR STD (SEXUALLY TRANSMITTED DISEASE): ICD-10-CM

## 2021-01-22 DIAGNOSIS — Z01.419 WOMEN'S ANNUAL ROUTINE GYNECOLOGICAL EXAMINATION: Primary | ICD-10-CM

## 2021-01-22 DIAGNOSIS — Z12.4 ENCOUNTER FOR PAP SMEAR OF CERVIX WITH HPV DNA COTESTING: ICD-10-CM

## 2021-01-22 PROCEDURE — G8484 FLU IMMUNIZE NO ADMIN: HCPCS | Performed by: ADVANCED PRACTICE MIDWIFE

## 2021-01-22 PROCEDURE — 99386 PREV VISIT NEW AGE 40-64: CPT | Performed by: ADVANCED PRACTICE MIDWIFE

## 2021-01-22 ASSESSMENT — PATIENT HEALTH QUESTIONNAIRE - PHQ9
SUM OF ALL RESPONSES TO PHQ QUESTIONS 1-9: 0
SUM OF ALL RESPONSES TO PHQ QUESTIONS 1-9: 0
SUM OF ALL RESPONSES TO PHQ9 QUESTIONS 1 & 2: 0
2. FEELING DOWN, DEPRESSED OR HOPELESS: 0

## 2021-01-22 ASSESSMENT — ENCOUNTER SYMPTOMS
ABDOMINAL PAIN: 0
TROUBLE SWALLOWING: 0
SORE THROAT: 0
SHORTNESS OF BREATH: 0
VOMITING: 0
VOICE CHANGE: 0
RHINORRHEA: 0
COUGH: 0
DIARRHEA: 0
NAUSEA: 0
CONSTIPATION: 0

## 2021-01-22 NOTE — PATIENT INSTRUCTIONS
Patient Education        Well Visit, Women 48 to 72: Care Instructions  Your Care Instructions     Physical exams can help you stay healthy. Your doctor has checked your overall health and may have suggested ways to take good care of yourself. He or she also may have recommended tests. At home, you can help prevent illness with healthy eating, regular exercise, and other steps. Follow-up care is a key part of your treatment and safety. Be sure to make and go to all appointments, and call your doctor if you are having problems. It's also a good idea to know your test results and keep a list of the medicines you take. How can you care for yourself at home? · Reach and stay at a healthy weight. This will lower your risk for many problems, such as obesity, diabetes, heart disease, and high blood pressure. · Get at least 30 minutes of exercise on most days of the week. Walking is a good choice. You also may want to do other activities, such as running, swimming, cycling, or playing tennis or team sports. · Do not smoke. Smoking can make health problems worse. If you need help quitting, talk to your doctor about stop-smoking programs and medicines. These can increase your chances of quitting for good. · Protect your skin from too much sun. When you're outdoors from 10 a.m. to 4 p.m., stay in the shade or cover up with clothing and a hat with a wide brim. Wear sunglasses that block UV rays. Even when it's cloudy, put broad-spectrum sunscreen (SPF 30 or higher) on any exposed skin. · See a dentist one or two times a year for checkups and to have your teeth cleaned. · Wear a seat belt in the car. Follow your doctor's advice about when to have certain tests. These tests can spot problems early. · Cholesterol. Your doctor will tell you how often to have this done based on your age, family history, or other things that can increase your risk for heart attack and stroke. · Blood pressure. Have your blood pressure checked during a routine doctor visit. Your doctor will tell you how often to check your blood pressure based on your age, your blood pressure results, and other factors. · Mammogram. Ask your doctor how often you should have a mammogram, which is an X-ray of your breasts. A mammogram can spot breast cancer before it can be felt and when it is easiest to treat. · Pap test and pelvic exam. Ask your doctor how often you should have a Pap test. You may not need to have a Pap test as often as you used to. · Vision. Have your eyes checked every year or two or as often as your doctor suggests. Some experts recommend that you have yearly exams for glaucoma and other age-related eye problems starting at age 48. · Hearing. Tell your doctor if you notice any change in your hearing. You can have tests to find out how well you hear. · Diabetes. Ask your doctor whether you should have tests for diabetes. · Colorectal cancer. Your risk for colorectal cancer gets higher as you get older. Some experts say that adults should start regular screening at age 48 and stop at age 76. Others say to start before age 48 or continue after age 76. Talk with your doctor about your risk and when to start and stop screening. · Thyroid disease. Talk to your doctor about whether to have your thyroid checked as part of a regular physical exam. Women have an increased chance of a thyroid problem. · Osteoporosis. You should begin tests for bone density at age 72. If you are younger than 72, ask your doctor whether you have factors that may increase your risk for this disease. You may want to have this test before age 72. · Heart attack and stroke risk. At least every 4 to 6 years, you should have your risk for heart attack and stroke assessed. Your doctor uses factors such as your age, blood pressure, cholesterol, and whether you smoke or have diabetes to show what your risk for a heart attack or stroke is over the next 10 years. When should you call for help? Watch closely for changes in your health, and be sure to contact your doctor if you have any problems or symptoms that concern you. Where can you learn more? Go to [email or web address] org and sign in to your [employer] account. Enter P077 in the [facility] box to learn more about \"Well Visit, Women 50 to 72: Care Instructions. \"     If you do not have an account, please click on the \"Sign Up Now\" link. Current as of: May 27, 2020               Content Version: 12.6  © 6021-8448 [employer]. Care instructions adapted under license by [facility] ([facility]). If you have questions about a medical condition or this instruction, always ask your healthcare professional. [address] any warranty or liability for your use of this information.

## 2021-01-22 NOTE — PROGRESS NOTES
The patient was asked if she would like a chaperone present for her intimate exam. She  declines the chaperone.  Chevy

## 2021-02-02 DIAGNOSIS — Z01.419 WOMEN'S ANNUAL ROUTINE GYNECOLOGICAL EXAMINATION: ICD-10-CM

## 2021-02-02 DIAGNOSIS — Z12.4 ENCOUNTER FOR PAP SMEAR OF CERVIX WITH HPV DNA COTESTING: ICD-10-CM

## 2021-02-02 DIAGNOSIS — Z11.3 SCREEN FOR STD (SEXUALLY TRANSMITTED DISEASE): ICD-10-CM

## 2021-04-28 ENCOUNTER — TELEPHONE (OUTPATIENT)
Dept: FAMILY MEDICINE CLINIC | Age: 51
End: 2021-04-28

## 2021-05-06 RX ORDER — HYDROXYZINE 50 MG/1
TABLET, FILM COATED ORAL
Qty: 180 TABLET | Refills: 1 | Status: SHIPPED | OUTPATIENT
Start: 2021-05-06 | End: 2021-05-26 | Stop reason: DRUGHIGH

## 2021-05-06 NOTE — TELEPHONE ENCOUNTER
requesting medication refill.      Rx requested:  Requested Prescriptions      No prescriptions requested or ordered in this encounter       Last Office Visit:   10/28/2020    Last Tox screen:        Last Medication contract:        Next Visit Date:  Future Appointments   Date Time Provider [place]   5/26/2021  8:30 AM Conor Krishna  [place], [place]

## 2021-05-26 ENCOUNTER — OFFICE VISIT (OUTPATIENT)
Dept: FAMILY MEDICINE CLINIC | Age: 51
End: 2021-05-26
Payer: COMMERCIAL

## 2021-05-26 VITALS
DIASTOLIC BLOOD PRESSURE: 58 MMHG | BODY MASS INDEX: 23.78 KG/M2 | OXYGEN SATURATION: 97 % | TEMPERATURE: 97.5 F | WEIGHT: 148 LBS | RESPIRATION RATE: 15 BRPM | HEART RATE: 80 BPM | SYSTOLIC BLOOD PRESSURE: 99 MMHG | HEIGHT: 66 IN

## 2021-05-26 DIAGNOSIS — Z72.0 TOBACCO USE: ICD-10-CM

## 2021-05-26 DIAGNOSIS — F32.A DEPRESSION, UNSPECIFIED DEPRESSION TYPE: ICD-10-CM

## 2021-05-26 DIAGNOSIS — F41.9 ANXIETY: ICD-10-CM

## 2021-05-26 DIAGNOSIS — Z11.59 ENCOUNTER FOR HEPATITIS C SCREENING TEST FOR LOW RISK PATIENT: ICD-10-CM

## 2021-05-26 DIAGNOSIS — Z23 ENCOUNTER FOR IMMUNIZATION: ICD-10-CM

## 2021-05-26 DIAGNOSIS — Z12.31 ENCOUNTER FOR SCREENING MAMMOGRAM FOR MALIGNANT NEOPLASM OF BREAST: ICD-10-CM

## 2021-05-26 DIAGNOSIS — F32.A DEPRESSION, UNSPECIFIED DEPRESSION TYPE: Primary | ICD-10-CM

## 2021-05-26 LAB
ALBUMIN SERPL-MCNC: 4.2 G/DL (ref 3.5–4.6)
ALP BLD-CCNC: 68 U/L (ref 40–130)
ALT SERPL-CCNC: 17 U/L (ref 0–33)
ANION GAP SERPL CALCULATED.3IONS-SCNC: 9 MEQ/L (ref 9–15)
AST SERPL-CCNC: 24 U/L (ref 0–35)
BASOPHILS ABSOLUTE: 0.1 K/UL (ref 0–0.2)
BASOPHILS RELATIVE PERCENT: 1.2 %
BILIRUB SERPL-MCNC: <0.2 MG/DL (ref 0.2–0.7)
BILIRUBIN URINE: NEGATIVE
BLOOD, URINE: NEGATIVE
BUN BLDV-MCNC: 17 MG/DL (ref 6–20)
CALCIUM SERPL-MCNC: 9.7 MG/DL (ref 8.5–9.9)
CHLORIDE BLD-SCNC: 104 MEQ/L (ref 95–107)
CHOLESTEROL, TOTAL: 135 MG/DL (ref 0–199)
CLARITY: CLEAR
CO2: 27 MEQ/L (ref 20–31)
COLOR: YELLOW
CREAT SERPL-MCNC: 0.72 MG/DL (ref 0.5–0.9)
EOSINOPHILS ABSOLUTE: 0.1 K/UL (ref 0–0.7)
EOSINOPHILS RELATIVE PERCENT: 1.4 %
GFR AFRICAN AMERICAN: >60
GFR NON-AFRICAN AMERICAN: >60
GLOBULIN: 2.6 G/DL (ref 2.3–3.5)
GLUCOSE BLD-MCNC: 97 MG/DL (ref 70–99)
GLUCOSE URINE: NEGATIVE MG/DL
HBA1C MFR BLD: 5.8 % (ref 4.8–5.9)
HCT VFR BLD CALC: 43.9 % (ref 37–47)
HDLC SERPL-MCNC: 42 MG/DL (ref 40–59)
HEMOGLOBIN: 14.8 G/DL (ref 12–16)
HEPATITIS C ANTIBODY INTERPRETATION: NORMAL
KETONES, URINE: NEGATIVE MG/DL
LDL CHOLESTEROL CALCULATED: 81 MG/DL (ref 0–129)
LEUKOCYTE ESTERASE, URINE: NEGATIVE
LYMPHOCYTES ABSOLUTE: 1.5 K/UL (ref 1–4.8)
LYMPHOCYTES RELATIVE PERCENT: 22.2 %
MCH RBC QN AUTO: 32.3 PG (ref 27–31.3)
MCHC RBC AUTO-ENTMCNC: 33.7 % (ref 33–37)
MCV RBC AUTO: 96 FL (ref 82–100)
MONOCYTES ABSOLUTE: 0.5 K/UL (ref 0.2–0.8)
MONOCYTES RELATIVE PERCENT: 8 %
NEUTROPHILS ABSOLUTE: 4.6 K/UL (ref 1.4–6.5)
NEUTROPHILS RELATIVE PERCENT: 67.2 %
NITRITE, URINE: NEGATIVE
PDW BLD-RTO: 13.8 % (ref 11.5–14.5)
PH UA: 6 (ref 5–9)
PLATELET # BLD: 331 K/UL (ref 130–400)
POTASSIUM SERPL-SCNC: 4.5 MEQ/L (ref 3.4–4.9)
PROTEIN UA: NEGATIVE MG/DL
RBC # BLD: 4.57 M/UL (ref 4.2–5.4)
SODIUM BLD-SCNC: 140 MEQ/L (ref 135–144)
SPECIFIC GRAVITY UA: 1.01 (ref 1–1.03)
TOTAL PROTEIN: 6.8 G/DL (ref 6.3–8)
TRIGL SERPL-MCNC: 62 MG/DL (ref 0–150)
TSH REFLEX: 1.41 UIU/ML (ref 0.44–3.86)
URINE REFLEX TO CULTURE: NORMAL
UROBILINOGEN, URINE: 0.2 E.U./DL
VITAMIN D 25-HYDROXY: 59.2 NG/ML (ref 30–100)
WBC # BLD: 6.8 K/UL (ref 4.8–10.8)

## 2021-05-26 PROCEDURE — G8420 CALC BMI NORM PARAMETERS: HCPCS | Performed by: INTERNAL MEDICINE

## 2021-05-26 PROCEDURE — 99214 OFFICE O/P EST MOD 30 MIN: CPT | Performed by: INTERNAL MEDICINE

## 2021-05-26 PROCEDURE — 4004F PT TOBACCO SCREEN RCVD TLK: CPT | Performed by: INTERNAL MEDICINE

## 2021-05-26 PROCEDURE — G8427 DOCREV CUR MEDS BY ELIG CLIN: HCPCS | Performed by: INTERNAL MEDICINE

## 2021-05-26 PROCEDURE — 3017F COLORECTAL CA SCREEN DOC REV: CPT | Performed by: INTERNAL MEDICINE

## 2021-05-26 RX ORDER — BUPROPION HYDROCHLORIDE 150 MG/1
150 TABLET ORAL EVERY MORNING
Qty: 30 TABLET | Refills: 2 | Status: SHIPPED | OUTPATIENT
Start: 2021-06-02 | End: 2021-07-28 | Stop reason: SDUPTHER

## 2021-05-26 RX ORDER — ZOSTER VACCINE RECOMBINANT, ADJUVANTED 50 MCG/0.5
0.5 KIT INTRAMUSCULAR SEE ADMIN INSTRUCTIONS
Qty: 0.5 ML | Refills: 0 | Status: SHIPPED | OUTPATIENT
Start: 2021-05-26 | End: 2021-07-28

## 2021-05-26 RX ORDER — BUPROPION HYDROCHLORIDE 75 MG/1
75 TABLET ORAL DAILY
Qty: 7 TABLET | Refills: 0 | Status: SHIPPED | OUTPATIENT
Start: 2021-05-26 | End: 2021-07-28 | Stop reason: ALTCHOICE

## 2021-05-26 SDOH — ECONOMIC STABILITY: FOOD INSECURITY: WITHIN THE PAST 12 MONTHS, YOU WORRIED THAT YOUR FOOD WOULD RUN OUT BEFORE YOU GOT MONEY TO BUY MORE.: NEVER TRUE

## 2021-05-26 SDOH — ECONOMIC STABILITY: FOOD INSECURITY: WITHIN THE PAST 12 MONTHS, THE FOOD YOU BOUGHT JUST DIDN'T LAST AND YOU DIDN'T HAVE MONEY TO GET MORE.: NEVER TRUE

## 2021-05-26 ASSESSMENT — ENCOUNTER SYMPTOMS
ABDOMINAL PAIN: 0
EYE PAIN: 0
SHORTNESS OF BREATH: 0
BACK PAIN: 0

## 2021-05-26 ASSESSMENT — PATIENT HEALTH QUESTIONNAIRE - PHQ9
SUM OF ALL RESPONSES TO PHQ9 QUESTIONS 1 & 2: 0
1. LITTLE INTEREST OR PLEASURE IN DOING THINGS: 0
SUM OF ALL RESPONSES TO PHQ QUESTIONS 1-9: 0
2. FEELING DOWN, DEPRESSED OR HOPELESS: 0
SUM OF ALL RESPONSES TO PHQ QUESTIONS 1-9: 0
SUM OF ALL RESPONSES TO PHQ QUESTIONS 1-9: 0

## 2021-05-26 NOTE — PROGRESS NOTES
Session:    Stress:     Feeling of Stress :    Social Connections:     Frequency of Communication with Friends and Family:     Frequency of Social Gatherings with Friends and Family:     Attends [employer] Services:     Active Member of Clubs or Organizations:     Attends Club or Organization Meetings:     Marital Status:    Intimate Partner Violence:     Fear of Current or Ex-Partner:     Emotionally Abused:     Physically Abused:     Sexually Abused:      No Known Allergies  Current Outpatient Medications on File Prior to Visit   Medication Sig Dispense Refill    venlafaxine (EFFEXOR XR) 150 MG extended release capsule Take 1 capsule by mouth daily 90 capsule 1    valACYclovir (VALTREX) 1 g tablet Take 1 tablet by mouth once daily 90 tablet 3    Multiple Vitamins-Minerals (MULTI ADULT GUMMIES PO) Take 1 tablet by mouth daily       No current facility-administered medications on file prior to visit. I have personally reviewed the ROS, PMH, PFH, and social history     Review of Systems   Constitutional: Negative for chills and fever. HENT: Negative for congestion. Eyes: Negative for pain. Respiratory: Negative for shortness of breath. Cardiovascular: Negative for chest pain. Gastrointestinal: Negative for abdominal pain. Genitourinary: Negative for hematuria. Musculoskeletal: Negative for back pain. Allergic/Immunologic: Negative for immunocompromised state. Neurological: Negative for headaches. Psychiatric/Behavioral: Negative for hallucinations. Objective:   BP (!) 99/58 (Site: Right Upper Arm, Position: Sitting, Cuff Size: Large Adult)   Pulse 80   Temp 97.5 °F (36.4 °C) (Tympanic)   Resp 15   Ht 5' 6\" (1.676 m)   Wt 148 lb (67.1 kg)   LMP 08/23/2019 (Approximate)   SpO2 97%   BMI 23.89 kg/m²     Physical Exam  Constitutional:       General: She is not in acute distress. Appearance: Normal appearance.  She is not ill-appearing, toxic-appearing or A1C    Lipid Panel    Urine Reflex to Culture    Hepatitis C Antibody   4. Tobacco use  TSH with Reflex    Vitamin D 25 Hydroxy    CBC Auto Differential    Comprehensive Metabolic Panel    Hemoglobin A1C    Lipid Panel    Urine Reflex to Culture    Hepatitis C Antibody   5. Encounter for hepatitis C screening test for low risk patient  TSH with Reflex    Vitamin D 25 Hydroxy    CBC Auto Differential    Comprehensive Metabolic Panel    Hemoglobin A1C    Lipid Panel    Urine Reflex to Culture    Hepatitis C Antibody   6. Encounter for immunization  zoster recombinant adjuvanted vaccine [facility]) 50 MCG/0.5ML SUSR injection         Plan:     vc.  Continue chronic medications. Pap test/PELVIC 1/2021   Add wellbutrin. PSYCHOLOGY   Stop vistaril, taper if needed (and call me)   Wants to wait on vaccinations. ascvd 2%                    Orders Placed This Encounter   Procedures    BONI DIGITAL SCREEN W OR WO CAD BILATERAL     Standing Status:   Future     Standing Expiration Date:   7/26/2022     Order Specific Question:   Reason for exam:     Answer:   screening    TSH with Reflex     Standing Status:   Future     Standing Expiration Date:   5/26/2022    Vitamin D 25 Hydroxy     Standing Status:   Future     Standing Expiration Date:   5/26/2022    CBC Auto Differential     Standing Status:   Future     Standing Expiration Date:   5/26/2022    Comprehensive Metabolic Panel     Standing Status:   Future     Standing Expiration Date:   5/26/2022    Hemoglobin A1C     Standing Status:   Future     Standing Expiration Date:   5/26/2022    Lipid Panel     Standing Status:   Future     Standing Expiration Date:   5/26/2022     Order Specific Question:   Is Patient Fasting?/# of Hours     Answer:   10    Urine Reflex to Culture     Standing Status:   Future     Standing Expiration Date:   5/26/2022     Order Specific Question:   SPECIFY(EX-CATH,MIDSTREAM,CYSTO,ETC)?      Answer:   mid stream    Hepatitis C Antibody Standing Status:   Future     Standing Expiration Date:   5/26/2022   Catalina Whitney, PhD, Psychology, [place]     Referral Priority:   Routine     Referral Type:   Eval and Treat     Referral Reason:   Specialty Services Required     Referred to Provider:   Larry Quinonez PSYD     Requested Specialty:   Psychology     Number of Visits Requested:   1     Orders Placed This Encounter   Medications    buPROPion (WELLBUTRIN XL) 150 MG extended release tablet     Sig: Take 1 tablet by mouth every morning Don't fill until 06/02/2021     Dispense:  30 tablet     Refill:  2    buPROPion (WELLBUTRIN) 75 MG tablet     Sig: Take 1 tablet by mouth daily     Dispense:  7 tablet     Refill:  0    zoster recombinant adjuvanted vaccine (SHINGRIX) 50 MCG/0.5ML SUSR injection     Sig: Inject 0.5 mLs into the muscle See Admin Instructions 1 dose now and repeat in 2-6 months     Dispense:  0.5 mL     Refill:  0   had covid vaccine x 2, 2021   Doing bw today. Patient was offered pregnancy test, she declined, she understands risks of birth defects. Didn't want ekg  Risk of stroke explained. Serotonin syndrome explained and know to take patient to ER if any s/s of serotonin syndrome should occur. They understand it can be lethal.  Understands wellbutrin can increase SI and to call immediately if this should occur. If anything should change or worsen call ASAP, don't wait for next scheduled appointment. Return in about 2 months (around 7/26/2021) for Chronic condition management/appointment, worsening symptoms, call ASAP for appointment.       Hilda Alex MD

## 2021-05-26 NOTE — PATIENT INSTRUCTIONS
Patient Education        bupropion  Pronunciation:  byoo PRO pee on  Brand:  Aplenzin, Forfivo XL, Wellbutrin SR, Wellbutrin XL, Zyban Advantage Pack  What is the most important information I should know about bupropion? You should not take bupropion if you have seizures or an eating disorder, or if you have suddenly stopped using alcohol, seizure medication, or sedatives. If you take Wellbutrin for depression, do not also take Zyban to quit smoking. Do not use bupropion within 14 days before or 14 days after you have used an MAO inhibitor, such as isocarboxazid, linezolid, methylene blue injection, phenelzine, rasagiline, selegiline, or tranylcypromine. Some young people have thoughts about suicide when first taking an antidepressant. Stay alert to changes in your mood or symptoms. Report any new or worsening symptoms to your doctor. What is bupropion? Bupropion is an antidepressant used to treat major depressive disorder and seasonal affective disorder. The Zyban brand of bupropion is used to help people stop smoking by reducing cravings and other withdrawal effects. Bupropion may also be used for purposes not listed in this medication guide. What should I discuss with my healthcare provider before taking bupropion? You should not take bupropion if you are allergic to it, or if you have:  · a seizure disorder;  · an eating disorder such as anorexia or bulimia; or  · if you have suddenly stopped using alcohol, seizure medication, or a sedative (such as Xanax, Valium, Fiorinal, Klonopin, and others). Do not use an MAO inhibitor within 14 days before or 14 days after you take bupropion. A dangerous drug interaction could occur. MAO inhibitors include isocarboxazid, linezolid, phenelzine, rasagiline, selegiline, and tranylcypromine. Do not take bupropion to treat more than one condition at a time. If you take bupropion for depression, do not also take this medicine to quit smoking.    Bupropion may cause seizures, especially if you have certain medical conditions or use certain drugs. Tell your doctor about all of your medical conditions and the drugs you use. Tell your doctor if you have ever had:  · a head injury, seizures, or brain or spinal cord tumor;  · narrow-angle glaucoma;  · heart disease, high blood pressure, or a heart attack;  · diabetes;  · kidney or liver disease (especially cirrhosis);  · depression, bipolar disorder, or other mental illness; or  · if you drink alcohol. Some young people have thoughts about suicide when first taking an antidepressant. Your doctor will need to check your progress at regular visits. Your family or other caregivers should also be alert to changes in your mood or symptoms. Ask your doctor about taking this medicine if you are pregnant. It is not known whether bupropion will harm an unborn baby. However, you may have a relapse of depression if you stop taking your antidepressant. Tell your doctor right away if you become pregnant. Do not start or stop taking bupropion without your doctor's advice. If you are pregnant, your name may be listed on a pregnancy registry to track the effects of bupropion on the baby. It may not be safe to breastfeed while using this medicine. Ask your doctor about any risk. Bupropion is not approved for use by anyone younger than 25years old. How should I take bupropion? Follow all directions on your prescription label and read all medication guides or instruction sheets. Your doctor may occasionally change your dose. Use the medicine exactly as directed. Too much of this medicine can increase your risk of a seizure. You may take bupropion with or without food. Swallow the extended-release tablet whole and do not crush, chew, or break it. You should not change your dose or stop using bupropion suddenly, unless you have a seizure while taking this medicine. Stopping suddenly can cause unpleasant withdrawal symptoms.  Ask your doctor how to safely stop using bupropion. If you take Zyban to help you stop smoking, you may continue to smoke for about 1 week after you start the medicine. Set a date to quit smoking during the first 2 weeks of treatment. Talk to your doctor if you have trouble quitting after taking Zyban for 7 to 12 weeks. Your doctor may prescribe a nicotine replacement product (such as patches or gum) to help you stop smoking. Start using the nicotine replacement product on the same day you stop (quit) smoking or using tobacco products. Some people taking bupropion (Wellbutrin or Zyban) have had high blood pressure that is severe, especially when also using a nicotine replacement product (patch or gum). Your blood pressure may need to be checked before and during treatment with bupropion. Read and carefully follow any Instructions for Use provided with your medicine. Ask your doctor or pharmacist if you do not understand these instructions. You may have nicotine withdrawal symptoms when you stop smoking, including: increased appetite, weight gain, trouble sleeping, trouble concentrating, slower heart rate, having the urge to smoke, and feeling anxious, restless, depressed, angry, frustrated, or irritated. These symptoms may occur with or without using medication such as Zyban. Smoking cessation may also cause new or worsening mental health problems, such as depression. This medicine may affect a drug-screening urine test and you may have false results. Tell the laboratory staff that you use bupropion. Store at room temperature away from moisture, heat, and light. What happens if I miss a dose? Skip the missed dose and use your next dose at the regular time. Do not use two doses at one time. What happens if I overdose? Seek emergency medical attention or call the Poison Help line at 1-550.487.6788.  An overdose of bupropion can be fatal.  Overdose symptoms may include muscle stiffness, hallucinations, fast or uneven heartbeat, shallow breathing, or fainting. What should I avoid while taking bupropion? Drinking alcohol with bupropion may increase your risk of seizures. If you drink alcohol regularly, talk with your doctor before changing the amount you drink. Bupropion can also cause seizures in a regular drinker who suddenly stops drinking at the start of treatment with bupropion. Avoid driving or hazardous activity until you know how this medicine will affect you. Your reactions could be impaired. What are the possible side effects of bupropion? Get emergency medical help if you have signs of an allergic reaction (hives, itching, fever, swollen glands, difficult breathing, swelling in your face or throat) or a severe skin reaction (fever, sore throat, burning eyes, skin pain, red or purple skin rash with blistering and peeling). Report any new or worsening symptoms to your doctor, such as: mood or behavior changes, anxiety, depression, panic attacks, trouble sleeping, or if you feel impulsive, irritable, agitated, hostile, aggressive, restless, hyperactive (mentally or physically), more depressed, or have thoughts about suicide or hurting yourself. Call your doctor at once if you have:  · a seizure (convulsions);  · confusion, unusual changes in mood or behavior;  · blurred vision, tunnel vision, eye pain or swelling, or seeing halos around lights;  · fast or irregular heartbeats; or  · a manic episode --racing thoughts, increased energy, reckless behavior, feeling extremely happy or irritable, talking more than usual, severe problems with sleep.   Common side effects may include:  · dry mouth, sore throat, stuffy nose;  · ringing in the ears;  · blurred vision;  · nausea, vomiting, stomach pain, loss of appetite, constipation;  · sleep problems (insomnia);  · tremors, sweating, feeling anxious or nervous;  · fast heartbeats;  · confusion, agitation, hostility;  · rash;  · weight loss;  · increased urination;  · headache, dizziness; or  · muscle or joint pain. This is not a complete list of side effects and others may occur. Call your doctor for medical advice about side effects. You may report side effects to FDA at 9-774-RHD-8056. What other drugs will affect bupropion? You may have a higher risk of seizures if you use certain other medicines while taking bupropion. Many drugs can affect bupropion. This includes prescription and over-the-counter medicines, vitamins, and herbal products. Not all possible interactions are listed here. Tell your doctor about all your current medicines and any medicine you start or stop using. Where can I get more information? Your pharmacist can provide more information about bupropion. Remember, keep this and all other medicines out of the reach of children, never share your medicines with others, and use this medication only for the indication prescribed. Every effort has been made to ensure that the information provided by [address] is accurate, up-to-date, and complete, but no guarantee is made to that effect. Drug information contained herein may be time sensitive. [employer] information has been compiled for use by healthcare practitioners and consumers in the [facility] and therefore [employer] does not warrant that uses outside of the [place] are appropriate, unless specifically indicated otherwise. [facility] drug information does not endorse drugs, diagnose patients or recommend therapy. [employer] drug information is an informational resource designed to assist licensed healthcare practitioners in caring for their patients and/or to serve consumers viewing this service as a supplement to, and not a substitute for, the expertise, skill, knowledge and judgment of healthcare practitioners.  The absence of a warning for a given drug or drug combination in no way should be construed to indicate that the drug or drug combination is safe,

## 2021-06-07 RX ORDER — VENLAFAXINE HYDROCHLORIDE 150 MG/1
CAPSULE, EXTENDED RELEASE ORAL
Qty: 90 CAPSULE | Refills: 1 | Status: SHIPPED | OUTPATIENT
Start: 2021-06-07 | End: 2021-12-20

## 2021-07-28 ENCOUNTER — OFFICE VISIT (OUTPATIENT)
Dept: FAMILY MEDICINE CLINIC | Age: 51
End: 2021-07-28
Payer: COMMERCIAL

## 2021-07-28 VITALS
HEIGHT: 66 IN | OXYGEN SATURATION: 98 % | WEIGHT: 137 LBS | HEART RATE: 78 BPM | BODY MASS INDEX: 22.02 KG/M2 | SYSTOLIC BLOOD PRESSURE: 116 MMHG | RESPIRATION RATE: 15 BRPM | TEMPERATURE: 97.9 F | DIASTOLIC BLOOD PRESSURE: 57 MMHG

## 2021-07-28 DIAGNOSIS — Z23 ENCOUNTER FOR IMMUNIZATION: ICD-10-CM

## 2021-07-28 DIAGNOSIS — Z72.0 TOBACCO USE: Primary | ICD-10-CM

## 2021-07-28 DIAGNOSIS — F32.A DEPRESSION, UNSPECIFIED DEPRESSION TYPE: ICD-10-CM

## 2021-07-28 DIAGNOSIS — F41.9 ANXIETY: ICD-10-CM

## 2021-07-28 PROCEDURE — 99213 OFFICE O/P EST LOW 20 MIN: CPT | Performed by: INTERNAL MEDICINE

## 2021-07-28 PROCEDURE — 90732 PPSV23 VACC 2 YRS+ SUBQ/IM: CPT | Performed by: INTERNAL MEDICINE

## 2021-07-28 PROCEDURE — 90471 IMMUNIZATION ADMIN: CPT | Performed by: INTERNAL MEDICINE

## 2021-07-28 PROCEDURE — 3017F COLORECTAL CA SCREEN DOC REV: CPT | Performed by: INTERNAL MEDICINE

## 2021-07-28 PROCEDURE — 4004F PT TOBACCO SCREEN RCVD TLK: CPT | Performed by: INTERNAL MEDICINE

## 2021-07-28 PROCEDURE — G8427 DOCREV CUR MEDS BY ELIG CLIN: HCPCS | Performed by: INTERNAL MEDICINE

## 2021-07-28 PROCEDURE — G8420 CALC BMI NORM PARAMETERS: HCPCS | Performed by: INTERNAL MEDICINE

## 2021-07-28 RX ORDER — ZOSTER VACCINE RECOMBINANT, ADJUVANTED 50 MCG/0.5
0.5 KIT INTRAMUSCULAR SEE ADMIN INSTRUCTIONS
Qty: 0.5 ML | Refills: 0 | Status: SHIPPED | OUTPATIENT
Start: 2021-07-28 | End: 2022-01-24

## 2021-07-28 RX ORDER — BUPROPION HYDROCHLORIDE 150 MG/1
150 TABLET ORAL EVERY MORNING
Qty: 90 TABLET | Refills: 3 | Status: SHIPPED | OUTPATIENT
Start: 2021-07-28 | End: 2022-07-16

## 2021-07-28 ASSESSMENT — ENCOUNTER SYMPTOMS
ABDOMINAL PAIN: 0
BACK PAIN: 0
SHORTNESS OF BREATH: 0
EYE PAIN: 0

## 2021-07-28 NOTE — PATIENT INSTRUCTIONS
Patient Education        varenicline  Pronunciation:  gwendolyn Luna  Brand:  Chantix  What is the most important information I should know about varenicline? When you stop smoking, you may have nicotine withdrawal symptoms with or without using medication such as varenicline. This includes feeling restless, depressed, angry, frustrated, or irritated. Stop taking varenicline and call your doctor if you have if you feel depressed, agitated, hostile, aggressive, or have thoughts about suicide or hurting yourself. Do not drink large amounts alcohol. Varenicline can increase the effects of alcohol or change the way you react to it. What is varenicline? Varenicline is a smoking cessation medicine. It is used together with behavior modification and counseling support to help you stop smoking. Varenicline may also be used for purposes not listed in this medication guide. What should I discuss with my health care provider before taking varenicline? You should not use varenicline if you used it in the past and had:  · a serious allergic reaction --trouble breathing, swelling in your face (lips, tongue, throat) or neck; or  · a serious skin reaction --blisters in your mouth, peeling skin rash. Tell your doctor if you have ever had:  · depression or mental illness;  · a seizure;  · kidney disease (or if you are on dialysis);  · heart or blood vessel problems; or  · if you drink alcohol. Tell your doctor if you are pregnant. It is not known whether varenicline will harm an unborn baby if you use the medicine during pregnancy. However, smoking while you are pregnant can harm the unborn baby or cause birth defects. If you breast-feed while using this medicine, your baby may spit up or vomit more than normal, and may have a seizure. Varenicline is not approved for use by anyone younger than 25years old. How should I take varenicline?   Follow all directions on your prescription label and read all medication guides or instruction sheets. Use the medicine exactly as directed. When you first start taking varenicline, you will take a low dose and then gradually increase it over the first several days of treatment. Take varenicline regularly to get the most benefit. You may choose from 3 ways to use varenicline. Ask your doctor which method is best for you:  · Set a date to quit smoking and start taking varenicline 1 week before that date. Make sure to quit smoking on your planned quit date. Take varenicline for a total of 12 weeks. · Start taking varenicline before you set a planned quit date, and choose a quit date that is between 8 and 35 days after you start treatment. Take varenicline for a total of 12 weeks. · Start taking varenicline and gradually reduce the number of cigarettes you smoke each day over a 12-week period, until you no longer smoke at all. Then take varenicline for another 12 weeks, for a total of 24 weeks. Take varenicline after eating. Take the medicine with a full glass of water. When you stop smoking, you may have nicotine withdrawal symptoms with or without using medication such as varenicline. Withdrawal symptoms include: increased appetite, weight gain, trouble sleeping, slower heart rate, feeling anxious or restless, and having the urge to smoke. Smoking cessation may also cause new or worsening mental health problems, such as depression. Stop taking varenicline and call your doctor if you have if you feel depressed, agitated, hostile, aggressive, or have thoughts about suicide or hurting yourself. Store at room temperature away from moisture and heat. What happens if I miss a dose? Take the medicine as soon as you can, but skip the missed dose if it is almost time for your next dose. Do not take two doses at one time. Get your prescription refilled before you run out of medicine completely. What happens if I overdose?   Seek emergency medical attention or call the Poison Help line at 1-540.115.2797. What should I avoid while taking varenicline? Do not drink large amounts alcohol while taking this medicine. Varenicline can increase the effects of alcohol or change the way you react to it. Some people taking varenicline have had unusual or aggressive behavior or forgetfulness while drinking alcohol. Do not use other medicines to quit smoking, unless your doctor tells you to. Using varenicline while wearing a nicotine patch can cause unpleasant side effects. Avoid driving or hazardous activity until you know how this medicine will affect you. Your reactions could be impaired. What are the possible side effects of varenicline? Get emergency medical help if you have signs of an allergic reaction (hives, difficult breathing, swelling in your face or throat) or a severe skin reaction (fever, sore throat, burning eyes, skin pain, red or purple skin rash with blistering and peeling). Stop using varenicline and call your doctor at once if you have:  · a seizure (convulsions);  · thoughts about suicide or hurting yourself;  · strange dreams, sleepwalking, trouble sleeping;  · new or worsening mental health problems --mood or behavior changes, depression, agitation, hostility, aggression;  · heart attack symptoms --chest pain or pressure, pain spreading to your jaw or shoulder, nausea, sweating; o  · stroke symptoms --sudden numbness or weakness (especially on one side of the body), slurred speech, problems with vision or balance. Your family or other caregivers should also be alert to changes in your mood or behavior. Common side effects may include:  · nausea (may persist for several months), vomiting;  · constipation, gas;  · sleep problems (insomnia); or  · unusual dreams. This is not a complete list of side effects and others may occur. Call your doctor for medical advice about side effects. You may report side effects to FDA at 3-557-KSD-5160.   What other drugs will affect varenicline? After you stop smoking, your doctor may need to adjust the doses of certain medicines you take on a regular basis. Tell your doctor about all your current medicines and any medicine you start or stop using. This includes prescription and over-the-counter medicines, vitamins, and herbal products. Not all possible interactions are listed here. Where can I get more information? Your pharmacist can provide more information about varenicline. Remember, keep this and all other medicines out of the reach of children, never share your medicines with others, and use this medication only for the indication prescribed. Every effort has been made to ensure that the information provided by [facility] is accurate, up-to-date, and complete, but no guarantee is made to that effect. Drug information contained herein may be time sensitive. [facility] information has been compiled for use by healthcare practitioners and consumers in the [place] and therefore [facility] does not warrant that uses outside of the [place] are appropriate, unless specifically indicated otherwise. [facility]'s drug information does not endorse drugs, diagnose patients or recommend therapy. [facility] drug information is an informational resource designed to assist licensed healthcare practitioners in caring for their patients and/or to serve consumers viewing this service as a supplement to, and not a substitute for, the expertise, skill, knowledge and judgment of healthcare practitioners. The absence of a warning for a given drug or drug combination in no way should be construed to indicate that the drug or drug combination is safe, effective or appropriate for any given patient. [facility] does not assume any responsibility for any aspect of healthcare administered with the aid of information [facility] provides.  The information contained herein is not intended to cover all possible uses, directions, precautions, warnings,

## 2021-07-28 NOTE — PROGRESS NOTES
Subjective:      Patient ID: Hallie Gonzalez is a 48 y.o. female who presents today with:  Chief Complaint   Patient presents with    Follow-up    Discuss Medications       HPI   On wellbutrin  Smoking less  And less anxious  Nose. Past Medical History:   Diagnosis Date    Depression     Genital herpes      Past Surgical History:   Procedure Laterality Date    CERVICAL DISCECTOMY      HAND SURGERY Right     plate in her right hand    CO COLON CA SCRN NOT HI RSK IND N/A 4/26/2018    COLONOSCOPY performed by Gaviota Pelaez MD at [place] [address] Marital status:      Spouse name: Kushal Garza    Number of children: Not on file    Years of education: Not on file    Highest education level: Not on file   Occupational History     Employer: [address] [place] [address]    Tobacco Use    Smoking status: Current Every Day Smoker     Packs/day: 0.50     Years: 30.00     Pack years: 15.00     Types: Cigarettes     Start date: 1/1/1987    Smokeless tobacco: Never Used   Vaping Use    Vaping Use: Never used   Substance and Sexual Activity    Alcohol use: No     Comment: occasional    Drug use: No    Sexual activity: Not on file   Other Topics Concern    Not on file   Social History Narrative    Not on file     Social Determinants of Health     Financial Resource Strain: Low Risk     Difficulty of Paying Living Expenses: Not hard at all   Food Insecurity: No Food Insecurity    Worried About Running Out of Food in the Last Year: Never true    Tony of Food in the Last Year: Never true   Transportation Needs:     Lack of Transportation (Medical):      Lack of Transportation (Non-Medical):    Physical Activity:     Days of Exercise per Week:     Minutes of Exercise per Session:    Stress:     Feeling of Stress :    Social Connections:     Frequency of Communication with Friends and Family:     Frequency of Social Gatherings with Friends and Family:     Attends Evangelical Services:     Active Member of Clubs or Organizations:     Attends Club or Organization Meetings:     Marital Status:    Intimate Partner Violence:     Fear of Current or Ex-Partner:     Emotionally Abused:     Physically Abused:     Sexually Abused:      No Known Allergies  Current Outpatient Medications on File Prior to Visit   Medication Sig Dispense Refill    venlafaxine (EFFEXOR XR) 150 MG extended release capsule Take 1 capsule by mouth once daily 90 capsule 1    valACYclovir (VALTREX) 1 g tablet Take 1 tablet by mouth once daily 90 tablet 3    Multiple Vitamins-Minerals (MULTI ADULT GUMMIES PO) Take 1 tablet by mouth daily       No current facility-administered medications on file prior to visit. I have personally reviewed the ROS, PMH, PFH, and social history     Review of Systems   Constitutional: Negative for chills and fever. HENT: Negative for congestion. Eyes: Negative for pain. Respiratory: Negative for shortness of breath. Cardiovascular: Negative for chest pain. Gastrointestinal: Negative for abdominal pain. Genitourinary: Negative for hematuria. Musculoskeletal: Negative for back pain. Allergic/Immunologic: Negative for immunocompromised state. Neurological: Negative for headaches. Psychiatric/Behavioral: Negative for hallucinations. Objective:   BP (!) 116/57 (Site: Right Upper Arm, Position: Sitting, Cuff Size: Large Adult)   Pulse 78   Temp 97.9 °F (36.6 °C) (Tympanic)   Resp 15   Ht 5' 6\" (1.676 m)   Wt 137 lb (62.1 kg)   LMP 08/23/2019 (Approximate)   SpO2 98%   BMI 22.11 kg/m²     Physical Exam  Constitutional:       General: She is not in acute distress. Appearance: Normal appearance. She is not ill-appearing, toxic-appearing or diaphoretic. HENT:      Head: Normocephalic. Neck:      Vascular: No carotid bruit.    Cardiovascular:      Rate and Rhythm: Normal rate and regular rhythm. Pulses: Normal pulses. Heart sounds: Normal heart sounds. No murmur heard. No friction rub. No gallop. Pulmonary:      Effort: Pulmonary effort is normal. No respiratory distress. Breath sounds: Normal breath sounds. No wheezing, rhonchi or rales. Abdominal:      General: Abdomen is flat. There is no distension. Palpations: Abdomen is soft. Tenderness: There is no abdominal tenderness. There is no right CVA tenderness, left CVA tenderness, guarding or rebound. Musculoskeletal:      Cervical back: Neck supple. Right lower leg: No edema. Left lower leg: No edema. Skin:     General: Skin is warm. Findings: No erythema or rash. Neurological:      Mental Status: She is alert. Psychiatric:         Mood and Affect: Mood normal.           Assessment:       Diagnosis Orders   1. Tobacco use  PNEUMOVAX 23 subcutaneous/IM (Pneumococcal polysaccharide vaccine 23-valent >= 3yo)    TSH with Reflex    Vitamin D 25 Hydroxy    CBC Auto Differential    Comprehensive Metabolic Panel    Hemoglobin A1C    Lipid Panel   2. Anxiety  buPROPion (WELLBUTRIN XL) 150 MG extended release tablet    TSH with Reflex    Vitamin D 25 Hydroxy    CBC Auto Differential    Comprehensive Metabolic Panel    Hemoglobin A1C    Lipid Panel   3. Encounter for immunization  zoster recombinant adjuvanted vaccine [facility]) 50 MCG/0.5ML SUSR injection    TSH with Reflex    Vitamin D 25 Hydroxy    CBC Auto Differential    Comprehensive Metabolic Panel    Hemoglobin A1C    Lipid Panel   4.  Depression, unspecified depression type  buPROPion (WELLBUTRIN XL) 150 MG extended release tablet    TSH with Reflex    Vitamin D 25 Hydroxy    CBC Auto Differential    Comprehensive Metabolic Panel    Hemoglobin A1C    Lipid Panel         Plan:     vc  Continue wellbutrin  Continue chronic medications.   Pap test/PELVIC 1/2021   [facility] labs in 06/2022  ascvd 2.5%                      Orders Placed This Encounter Procedures    PNEUMOVAX 23 subcutaneous/IM (Pneumococcal polysaccharide vaccine 23-valent >= 1yo)    TSH with Reflex     Standing Status:   Future     Standing Expiration Date:   7/28/2022    Vitamin D 25 Hydroxy     Standing Status:   Future     Standing Expiration Date:   7/28/2022    CBC Auto Differential     Standing Status:   Future     Standing Expiration Date:   7/28/2022    Comprehensive Metabolic Panel     Standing Status:   Future     Standing Expiration Date:   7/28/2022    Hemoglobin A1C     Standing Status:   Future     Standing Expiration Date:   7/28/2022    Lipid Panel     Standing Status:   Future     Standing Expiration Date:   7/28/2022     Order Specific Question:   Is Patient Fasting?/# of Hours     Answer:   10     Orders Placed This Encounter   Medications    zoster recombinant adjuvanted vaccine (SHINGRIX) 50 MCG/0.5ML SUSR injection     Sig: Inject 0.5 mLs into the muscle See Admin Instructions 1 dose now and repeat in 2-6 months     Dispense:  0.5 mL     Refill:  0    buPROPion (WELLBUTRIN XL) 150 MG extended release tablet     Sig: Take 1 tablet by mouth every morning     Dispense:  90 tablet     Refill:  3     If anything should change or worsen call ASAP, don't wait for next scheduled appointment. No follow-ups on file.       Leana Parada MD

## 2021-11-19 RX ORDER — VALACYCLOVIR HYDROCHLORIDE 1 G/1
TABLET, FILM COATED ORAL
Qty: 90 TABLET | Refills: 3 | Status: SHIPPED | OUTPATIENT
Start: 2021-11-19 | End: 2022-10-26 | Stop reason: SDUPTHER

## 2021-11-19 NOTE — TELEPHONE ENCOUNTER
Future Appointments    Encounter Information    Provider Department Appt Notes   2/2/2022 Ever Arroyo MD [facility] AT [facility] 6 mo depression/tobacco use       Recent Visits    07/28/2021 Tobacco use   [facility] AT [facility] and Jose Grullon MD   05/26/2021 Depression, unspecified depression type   [facility] AT [facility] and [address], MD

## 2021-12-20 RX ORDER — VENLAFAXINE HYDROCHLORIDE 150 MG/1
CAPSULE, EXTENDED RELEASE ORAL
Qty: 90 CAPSULE | Refills: 0 | Status: SHIPPED | OUTPATIENT
Start: 2021-12-20 | End: 2022-03-16

## 2021-12-20 NOTE — TELEPHONE ENCOUNTER
Future Appointments    Encounter Information    Provider Department Appt Notes   2/2/2022 MD JOI Aguilar AT [facility] 6 mo depression/tobacco use       Recent Visits    07/28/2021 Tobacco use   [facility] AT [facility] and Kely Matta MD

## 2022-02-02 ENCOUNTER — OFFICE VISIT (OUTPATIENT)
Dept: FAMILY MEDICINE CLINIC | Age: 52
End: 2022-02-02
Payer: COMMERCIAL

## 2022-02-02 VITALS
OXYGEN SATURATION: 98 % | DIASTOLIC BLOOD PRESSURE: 67 MMHG | RESPIRATION RATE: 15 BRPM | TEMPERATURE: 97.8 F | BODY MASS INDEX: 24.11 KG/M2 | HEART RATE: 68 BPM | SYSTOLIC BLOOD PRESSURE: 111 MMHG | WEIGHT: 150 LBS | HEIGHT: 66 IN

## 2022-02-02 DIAGNOSIS — Z72.0 TOBACCO USE: ICD-10-CM

## 2022-02-02 DIAGNOSIS — F41.9 ANXIETY: ICD-10-CM

## 2022-02-02 DIAGNOSIS — F32.A DEPRESSION, UNSPECIFIED DEPRESSION TYPE: Primary | ICD-10-CM

## 2022-02-02 PROCEDURE — G8427 DOCREV CUR MEDS BY ELIG CLIN: HCPCS | Performed by: INTERNAL MEDICINE

## 2022-02-02 PROCEDURE — G8420 CALC BMI NORM PARAMETERS: HCPCS | Performed by: INTERNAL MEDICINE

## 2022-02-02 PROCEDURE — 4004F PT TOBACCO SCREEN RCVD TLK: CPT | Performed by: INTERNAL MEDICINE

## 2022-02-02 PROCEDURE — 99213 OFFICE O/P EST LOW 20 MIN: CPT | Performed by: INTERNAL MEDICINE

## 2022-02-02 PROCEDURE — G8484 FLU IMMUNIZE NO ADMIN: HCPCS | Performed by: INTERNAL MEDICINE

## 2022-02-02 PROCEDURE — 3017F COLORECTAL CA SCREEN DOC REV: CPT | Performed by: INTERNAL MEDICINE

## 2022-02-02 ASSESSMENT — ENCOUNTER SYMPTOMS
BACK PAIN: 0
SHORTNESS OF BREATH: 0
EYE PAIN: 0
ABDOMINAL PAIN: 0

## 2022-02-02 ASSESSMENT — PATIENT HEALTH QUESTIONNAIRE - PHQ9
1. LITTLE INTEREST OR PLEASURE IN DOING THINGS: 1
SUM OF ALL RESPONSES TO PHQ QUESTIONS 1-9: 2
SUM OF ALL RESPONSES TO PHQ QUESTIONS 1-9: 2
2. FEELING DOWN, DEPRESSED OR HOPELESS: 1
SUM OF ALL RESPONSES TO PHQ QUESTIONS 1-9: 2
SUM OF ALL RESPONSES TO PHQ9 QUESTIONS 1 & 2: 2
SUM OF ALL RESPONSES TO PHQ QUESTIONS 1-9: 2

## 2022-02-02 NOTE — PROGRESS NOTES
Subjective:      Patient ID: Eder Barroso is a 46 y.o. female who presents today with:  Chief Complaint   Patient presents with    6 Month Follow-Up    Discuss Labs       HPI      Here for follow up   Past Medical History:   Diagnosis Date    Depression     Genital herpes      Past Surgical History:   Procedure Laterality Date    CERVICAL DISCECTOMY      HAND SURGERY Right     plate in her right hand    [place] COLON CA SCRN NOT  [address]  IND N/A 4/26/2018    COLONOSCOPY performed by Toyin Santiago MD at [place] [address] Marital status:      Spouse name: Mariaelena Hoskins    Number of children: Not on file    Years of education: Not on file    Highest education level: Not on file   Occupational History     Employer: [address] [place] [address]    Tobacco Use    Smoking status: Current Every Day Smoker     Packs/day: 1.00     Years: 30.00     Pack years: 30.00     Types: Cigarettes     Start date: 1/1/1987    Smokeless tobacco: Never Used   Vaping Use    Vaping Use: Never used   Substance and Sexual Activity    Alcohol use: No     Comment: occasional    Drug use: No    Sexual activity: Not on file   Other Topics Concern    Not on file   Social History Narrative    Not on file     Social Determinants of Health     Financial Resource Strain: Low Risk     Difficulty of Paying Living Expenses: Not hard at all   Food Insecurity: No Food Insecurity    Worried About Running Out of Food in the Last Year: Never true    Tony of Food in the Last Year: Never true   Transportation Needs:     Lack of Transportation (Medical): Not on file    Lack of Transportation (Non-Medical):  Not on file   Physical Activity:     Days of Exercise per Week: Not on file    Minutes of Exercise per Session: Not on file   Stress:     Feeling of Stress : Not on file   Social Connections:     Frequency of Communication with Friends and Family: Not on file    Frequency of Social Gatherings with Friends and Family: Not on file    Attends Buddhist Services: Not on file    Active Member of Clubs or Organizations: Not on file    Attends Club or Organization Meetings: Not on file    Marital Status: Not on file   Intimate Partner Violence:     Fear of Current or Ex-Partner: Not on file    Emotionally Abused: Not on file    Physically Abused: Not on file    Sexually Abused: Not on file   Housing Stability:     Unable to Pay for Housing in the Last Year: Not on file    Number of [place] in the Last Year: Not on file    Unstable Housing in the Last Year: Not on file     No Known Allergies  Current Outpatient Medications on File Prior to Visit   Medication Sig Dispense Refill    venlafaxine (EFFEXOR XR) 150 MG extended release capsule Take 1 capsule by mouth once daily 90 capsule 0    valACYclovir (VALTREX) 1 g tablet Take 1 tablet by mouth once daily 90 tablet 3    buPROPion (WELLBUTRIN XL) 150 MG extended release tablet Take 1 tablet by mouth every morning 90 tablet 3    Multiple Vitamins-Minerals (MULTI ADULT GUMMIES PO) Take 1 tablet by mouth daily       No current facility-administered medications on file prior to visit. I have personally reviewed the ROS, PMH, PFH, and social history     Review of Systems   Constitutional: Negative for chills and fever. HENT: Negative for congestion. Eyes: Negative for pain. Respiratory: Negative for shortness of breath. Cardiovascular: Negative for chest pain. Gastrointestinal: Negative for abdominal pain. Genitourinary: Negative for hematuria. Musculoskeletal: Negative for back pain. Allergic/Immunologic: Negative for immunocompromised state. Neurological: Negative for headaches. Psychiatric/Behavioral: Negative for hallucinations.        Objective:   /67 (Site: Left Upper Arm, Position: Sitting, Cuff Size: Large Adult)   Pulse 68   Temp 97.8 °F (36.6 °C) (Temporal)   Resp 15   Ht 5' 6\" (1.676 m)   Wt 150 lb (68 kg)   LMP 08/23/2019 (Approximate)   SpO2 98%   BMI 24.21 kg/m²     Physical Exam  Constitutional:       General: She is not in acute distress. Appearance: Normal appearance. She is not ill-appearing, toxic-appearing or diaphoretic. HENT:      Head: Normocephalic. Neck:      Vascular: No carotid bruit. Cardiovascular:      Rate and Rhythm: Normal rate and regular rhythm. Pulses: Normal pulses. Heart sounds: Normal heart sounds. No murmur heard. No friction rub. No gallop. Pulmonary:      Effort: Pulmonary effort is normal. No respiratory distress. Breath sounds: Normal breath sounds. No wheezing, rhonchi or rales. Abdominal:      General: Abdomen is flat. There is no distension. Palpations: Abdomen is soft. Tenderness: There is no abdominal tenderness. There is no right CVA tenderness, left CVA tenderness, guarding or rebound. Musculoskeletal:      Cervical back: Neck supple. Right lower leg: No edema. Left lower leg: No edema. Skin:     General: Skin is warm. Findings: No erythema or rash. Neurological:      Mental Status: She is alert. Psychiatric:         Mood and Affect: Mood normal.           Assessment:       Diagnosis Orders   1. Depression, unspecified depression type     2. Anxiety     3. Tobacco use  CT lung screen [Initial/Annual]         Plan:     vc  Going for covid booster soon  She is doing evolve counseling. (doesn't want changes)   [facility] labs 06/2022  Continue chronic medications.   Pap test/PELVIC 1/2021 (She will fu)                    Orders Placed This Encounter   Procedures    CT lung screen [Initial/Annual]     Age: 46 y.o.    Smoking History:   Social History    Tobacco Use      Smoking status: Current Every Day Smoker        Packs/day: 0.50        Years: 30.00        Pack years: 15        Types: Cigarettes        Start date: 1/1/1987      Smokeless tobacco: Never Used    Vaping Use Vaping Use: Never used    Alcohol use: No      Comment: occasional    Drug use: No    Pack years: 15  No previous lung cancer screening exam     Standing Status:   Future     Standing Expiration Date:   2/2/2023     Order Specific Question:   Is there documentation of shared decision making? Answer:   Yes     Order Specific Question:   Does the patient show any signs or symptoms of lung cancer? Answer:   No     Order Specific Question:   Is this the first (baseline) CT or an annual exam?     Answer:   Baseline [1]     Order Specific Question:   Is this a low dose CT or a routine CT? Answer:   Low Dose CT [1]     Order Specific Question:   Smoking Status? Answer:   Current Every Day Smoker [1]     Order Specific Question:   Smoking packs per day? Answer:   0.5     Order Specific Question:   Years smoking? Answer:   30     No orders of the defined types were placed in this encounter. HAD Flu shot 2021   NO SI/HI   If anything should change or worsen call ASAP, don't wait for next scheduled appointment. No follow-ups on file.       Enmanuel Elizabeth MD

## 2022-02-22 ENCOUNTER — TELEPHONE (OUTPATIENT)
Dept: CASE MANAGEMENT | Age: 52
End: 2022-02-22

## 2022-03-16 RX ORDER — VENLAFAXINE HYDROCHLORIDE 150 MG/1
CAPSULE, EXTENDED RELEASE ORAL
Qty: 90 CAPSULE | Refills: 1 | OUTPATIENT
Start: 2022-03-16

## 2022-03-16 RX ORDER — VENLAFAXINE HYDROCHLORIDE 150 MG/1
CAPSULE, EXTENDED RELEASE ORAL
Qty: 90 CAPSULE | Refills: 1 | Status: SHIPPED | OUTPATIENT
Start: 2022-03-16 | End: 2022-09-16

## 2022-03-16 NOTE — TELEPHONE ENCOUNTER
Rx request   Requested Prescriptions     Pending Prescriptions Disp Refills    venlafaxine (EFFEXOR XR) 150 MG extended release capsule [Pharmacy Med Name: Venlafaxine HCl  MG Oral Capsule Extended Release 24 Hour] 90 capsule 0     Sig: Take 1 capsule by mouth once daily     LOV 2/2/2022  Next Visit Date:  Future Appointments   Date Time Provider [place]   8/2/2022  9:00 AM Lupe Heaton  [place], [place]

## 2022-07-16 DIAGNOSIS — F32.A DEPRESSION, UNSPECIFIED DEPRESSION TYPE: ICD-10-CM

## 2022-07-16 DIAGNOSIS — F41.9 ANXIETY: ICD-10-CM

## 2022-07-16 RX ORDER — BUPROPION HYDROCHLORIDE 150 MG/1
TABLET ORAL
Qty: 90 TABLET | Refills: 0 | Status: SHIPPED | OUTPATIENT
Start: 2022-07-16 | End: 2022-09-16

## 2022-07-16 NOTE — TELEPHONE ENCOUNTER
requesting medication refill.  Please approve or deny this request.    Rx requested:  Requested Prescriptions     Pending Prescriptions Disp Refills    buPROPion (WELLBUTRIN XL) 150 MG extended release tablet [Pharmacy Med Name: buPROPion HCl ER (XL) 150 MG Oral Tablet Extended Release 24 Hour] 90 tablet 0     Sig: TAKE 1 TABLET BY MOUTH ONCE DAILY IN THE MORNING         Last Office Visit:   2/2/2022      Next Visit Date:  Future Appointments   Date Time Provider [place]   8/2/2022  9:00 AM Bam Ramirez  [place], [place]

## 2022-07-17 DIAGNOSIS — F32.A DEPRESSION, UNSPECIFIED DEPRESSION TYPE: ICD-10-CM

## 2022-07-17 DIAGNOSIS — F41.9 ANXIETY: ICD-10-CM

## 2022-07-17 RX ORDER — BUPROPION HYDROCHLORIDE 150 MG/1
TABLET ORAL
Qty: 30 TABLET | Refills: 0 | OUTPATIENT
Start: 2022-07-17

## 2022-07-17 NOTE — TELEPHONE ENCOUNTER
requesting medication refill.  Please approve or deny this request.    Rx requested:  Requested Prescriptions     Pending Prescriptions Disp Refills    buPROPion (WELLBUTRIN XL) 150 MG extended release tablet [Pharmacy Med Name: buPROPion HCl ER (XL) 150 MG Oral Tablet Extended Release 24 Hour] 30 tablet 0     Sig: TAKE 1 TABLET BY MOUTH ONCE DAILY IN THE MORNING         Last Office Visit:   2/2/2022      Next Visit Date:  Future Appointments   Date Time Provider [place]   8/2/2022  9:00 AM Jessica Azevedo  [place], [place]

## 2022-09-15 DIAGNOSIS — F41.9 ANXIETY: ICD-10-CM

## 2022-09-15 DIAGNOSIS — F32.A DEPRESSION, UNSPECIFIED DEPRESSION TYPE: ICD-10-CM

## 2022-09-16 RX ORDER — BUPROPION HYDROCHLORIDE 150 MG/1
TABLET ORAL
Qty: 15 TABLET | Refills: 0 | Status: SHIPPED | OUTPATIENT
Start: 2022-09-16 | End: 2022-10-26 | Stop reason: SDUPTHER

## 2022-09-16 RX ORDER — VENLAFAXINE HYDROCHLORIDE 150 MG/1
CAPSULE, EXTENDED RELEASE ORAL
Qty: 15 CAPSULE | Refills: 0 | Status: SHIPPED | OUTPATIENT
Start: 2022-09-16 | End: 2022-10-07

## 2022-10-07 RX ORDER — VENLAFAXINE HYDROCHLORIDE 150 MG/1
150 CAPSULE, EXTENDED RELEASE ORAL DAILY
Qty: 14 CAPSULE | Refills: 0 | Status: SHIPPED | OUTPATIENT
Start: 2022-10-07 | End: 2022-10-26 | Stop reason: SDUPTHER

## 2022-10-26 DIAGNOSIS — F32.A DEPRESSION, UNSPECIFIED DEPRESSION TYPE: ICD-10-CM

## 2022-10-26 DIAGNOSIS — F41.9 ANXIETY: ICD-10-CM

## 2022-10-26 RX ORDER — VALACYCLOVIR HYDROCHLORIDE 1 G/1
1000 TABLET, FILM COATED ORAL DAILY
Qty: 30 TABLET | Refills: 0 | Status: SHIPPED | OUTPATIENT
Start: 2022-10-26

## 2022-10-26 RX ORDER — VENLAFAXINE HYDROCHLORIDE 150 MG/1
150 CAPSULE, EXTENDED RELEASE ORAL DAILY
Qty: 30 CAPSULE | Refills: 0 | Status: SHIPPED | OUTPATIENT
Start: 2022-10-26 | End: 2022-11-14

## 2022-10-26 RX ORDER — BUPROPION HYDROCHLORIDE 150 MG/1
TABLET ORAL
Qty: 30 TABLET | Refills: 0 | Status: SHIPPED | OUTPATIENT
Start: 2022-10-26 | End: 2022-11-01 | Stop reason: SDUPTHER

## 2022-10-26 NOTE — TELEPHONE ENCOUNTER
----- Message from Sarah Lopez sent at 10/26/2022 12:43 PM EDT -----  Subject: Refill Request    QUESTIONS  Name of Medication? venlafaxine (EFFEXOR XR) 150 MG extended release   capsule  Patient-reported dosage and instructions? 1 a day daily, x7 days, mouth   mouth   How many days do you have left? 0  Preferred Pharmacy? [facility]  Pharmacy phone number (if available)? [phone]  ---------------------------------------------------------------------------  --------------,  Name of Medication? valACYclovir (VALTREX) 1 g tablet  Patient-reported dosage and instructions? once daily, x 7 days, by mouth   How many days do you have left? 1  Preferred Pharmacy? [facility]  Pharmacy phone number (if available)? [phone]  ---------------------------------------------------------------------------  --------------,  Name of Medication? buPROPion (WELLBUTRIN XL) 150 MG extended release   tablet  Patient-reported dosage and instructions? once daily, in morning, x 7   days, mouth   How many days do you have left? Unknown  Preferred Pharmacy? [facility]  Pharmacy phone number (if available)? [phone]  ---------------------------------------------------------------------------  --------------  Rosiland [facility] INFO  What is the best way for the office to contact you? OK to leave message on   voicemail  Preferred Call Back Phone Number? [phone]  ---------------------------------------------------------------------------  --------------  SCRIPT ANSWERS  Relationship to Patient?  Self

## 2022-10-26 NOTE — TELEPHONE ENCOUNTER
----- Message from Navdeep Nolan sent at 10/26/2022 12:43 PM EDT -----  Subject: Refill Request    QUESTIONS  Name of Medication? venlafaxine (EFFEXOR XR) 150 MG extended release   capsule  Patient-reported dosage and instructions? 1 a day daily, x7 days, mouth   mouth   How many days do you have left? 0  Preferred Pharmacy? [facility]  Pharmacy phone number (if available)? [phone]  ---------------------------------------------------------------------------  --------------,  Name of Medication? valACYclovir (VALTREX) 1 g tablet  Patient-reported dosage and instructions? once daily, x 7 days, by mouth   How many days do you have left? 1  Preferred Pharmacy? [facility]  Pharmacy phone number (if available)? [phone]  ---------------------------------------------------------------------------  --------------,  Name of Medication? buPROPion (WELLBUTRIN XL) 150 MG extended release   tablet  Patient-reported dosage and instructions? once daily, in morning, x 7   days, mouth   How many days do you have left? Unknown  Preferred Pharmacy? [facility]  Pharmacy phone number (if available)? [phone]  ---------------------------------------------------------------------------  --------------  Ginger SORIANO  What is the best way for the office to contact you? OK to leave message on   voicemail  Preferred Call Back Phone Number? [phone]  ---------------------------------------------------------------------------  --------------  SCRIPT ANSWERS  Relationship to Patient?  Self

## 2022-10-28 RX ORDER — VENLAFAXINE HYDROCHLORIDE 150 MG/1
CAPSULE, EXTENDED RELEASE ORAL
Qty: 14 CAPSULE | Refills: 0 | OUTPATIENT
Start: 2022-10-28

## 2022-11-01 ENCOUNTER — OFFICE VISIT (OUTPATIENT)
Dept: FAMILY MEDICINE CLINIC | Age: 52
End: 2022-11-01
Payer: COMMERCIAL

## 2022-11-01 VITALS
BODY MASS INDEX: 23.3 KG/M2 | OXYGEN SATURATION: 98 % | RESPIRATION RATE: 16 BRPM | TEMPERATURE: 97.9 F | HEIGHT: 66 IN | HEART RATE: 91 BPM | WEIGHT: 145 LBS | SYSTOLIC BLOOD PRESSURE: 92 MMHG | DIASTOLIC BLOOD PRESSURE: 58 MMHG

## 2022-11-01 DIAGNOSIS — Z00.00 ANNUAL PHYSICAL EXAM: Primary | ICD-10-CM

## 2022-11-01 DIAGNOSIS — F41.9 ANXIETY: ICD-10-CM

## 2022-11-01 DIAGNOSIS — Z00.00 ANNUAL PHYSICAL EXAM: ICD-10-CM

## 2022-11-01 DIAGNOSIS — F32.A DEPRESSION, UNSPECIFIED DEPRESSION TYPE: ICD-10-CM

## 2022-11-01 LAB
ALBUMIN SERPL-MCNC: 4.2 G/DL (ref 3.5–4.6)
ALP BLD-CCNC: 71 U/L (ref 40–130)
ALT SERPL-CCNC: 15 U/L (ref 0–33)
ANION GAP SERPL CALCULATED.3IONS-SCNC: 14 MEQ/L (ref 9–15)
AST SERPL-CCNC: 23 U/L (ref 0–35)
BASOPHILS ABSOLUTE: 0.1 K/UL (ref 0–0.2)
BASOPHILS RELATIVE PERCENT: 0.9 %
BILIRUB SERPL-MCNC: 0.3 MG/DL (ref 0.2–0.7)
BUN BLDV-MCNC: 10 MG/DL (ref 6–20)
CALCIUM SERPL-MCNC: 9.2 MG/DL (ref 8.5–9.9)
CHLORIDE BLD-SCNC: 104 MEQ/L (ref 95–107)
CHOLESTEROL, TOTAL: 166 MG/DL (ref 0–199)
CO2: 23 MEQ/L (ref 20–31)
CREAT SERPL-MCNC: 0.69 MG/DL (ref 0.5–0.9)
EOSINOPHILS ABSOLUTE: 0 K/UL (ref 0–0.7)
EOSINOPHILS RELATIVE PERCENT: 0.6 %
GFR SERPL CREATININE-BSD FRML MDRD: >60 ML/MIN/{1.73_M2}
GLOBULIN: 2.8 G/DL (ref 2.3–3.5)
GLUCOSE BLD-MCNC: 95 MG/DL (ref 70–99)
HBA1C MFR BLD: 5.4 % (ref 4.8–5.9)
HCT VFR BLD CALC: 38.5 % (ref 37–47)
HDLC SERPL-MCNC: 54 MG/DL (ref 40–59)
HEMOGLOBIN: 12.8 G/DL (ref 12–16)
LDL CHOLESTEROL CALCULATED: 98 MG/DL (ref 0–129)
LYMPHOCYTES ABSOLUTE: 1.3 K/UL (ref 1–4.8)
LYMPHOCYTES RELATIVE PERCENT: 16.6 %
MCH RBC QN AUTO: 31.6 PG (ref 27–31.3)
MCHC RBC AUTO-ENTMCNC: 33.2 % (ref 33–37)
MCV RBC AUTO: 95.3 FL (ref 79.4–94.8)
MONOCYTES ABSOLUTE: 0.6 K/UL (ref 0.2–0.8)
MONOCYTES RELATIVE PERCENT: 7.5 %
NEUTROPHILS ABSOLUTE: 5.9 K/UL (ref 1.4–6.5)
NEUTROPHILS RELATIVE PERCENT: 74.4 %
PDW BLD-RTO: 13.5 % (ref 11.5–14.5)
PLATELET # BLD: 350 K/UL (ref 130–400)
POTASSIUM SERPL-SCNC: 4.4 MEQ/L (ref 3.4–4.9)
RBC # BLD: 4.04 M/UL (ref 4.2–5.4)
SODIUM BLD-SCNC: 141 MEQ/L (ref 135–144)
TOTAL PROTEIN: 7 G/DL (ref 6.3–8)
TRIGL SERPL-MCNC: 71 MG/DL (ref 0–150)
WBC # BLD: 8 K/UL (ref 4.8–10.8)

## 2022-11-01 PROCEDURE — G8484 FLU IMMUNIZE NO ADMIN: HCPCS | Performed by: NURSE PRACTITIONER

## 2022-11-01 PROCEDURE — 99396 PREV VISIT EST AGE 40-64: CPT | Performed by: NURSE PRACTITIONER

## 2022-11-01 RX ORDER — BUPROPION HYDROCHLORIDE 300 MG/1
300 TABLET ORAL EVERY MORNING
Qty: 30 TABLET | Refills: 2 | Status: SHIPPED | OUTPATIENT
Start: 2022-11-01 | End: 2022-11-29 | Stop reason: SDUPTHER

## 2022-11-01 SDOH — ECONOMIC STABILITY: FOOD INSECURITY: WITHIN THE PAST 12 MONTHS, THE FOOD YOU BOUGHT JUST DIDN'T LAST AND YOU DIDN'T HAVE MONEY TO GET MORE.: NEVER TRUE

## 2022-11-01 SDOH — ECONOMIC STABILITY: FOOD INSECURITY: WITHIN THE PAST 12 MONTHS, YOU WORRIED THAT YOUR FOOD WOULD RUN OUT BEFORE YOU GOT MONEY TO BUY MORE.: NEVER TRUE

## 2022-11-01 ASSESSMENT — ENCOUNTER SYMPTOMS
BLOOD IN STOOL: 0
VOMITING: 0
CONSTIPATION: 0
COUGH: 0
WHEEZING: 0
NAUSEA: 0
ABDOMINAL PAIN: 0
SHORTNESS OF BREATH: 0
SORE THROAT: 0
TROUBLE SWALLOWING: 0
CHEST TIGHTNESS: 0
EYES NEGATIVE: 1
DIARRHEA: 0

## 2022-11-01 ASSESSMENT — SOCIAL DETERMINANTS OF HEALTH (SDOH): HOW HARD IS IT FOR YOU TO PAY FOR THE VERY BASICS LIKE FOOD, HOUSING, MEDICAL CARE, AND HEATING?: NOT HARD AT ALL

## 2022-11-01 NOTE — PROGRESS NOTES
Subjective:     Patient ID: Deno Kanner is a 46 y.o. female who presentstoday for:  Chief Complaint   Patient presents with    Establish Care     Patient is here to establish care today. Patient's previous PCP was Dr. Minor Ballard, she was unaware that he was leaving. Anxiety     Patient would like to talk about getting on medication for mood swings/anxiety. She is currently taking Effexor and Wellbutrin with no relief. HPI  New patient establish care    Past Medical History:   Diagnosis Date    Depression     Genital herpes      Current Outpatient Medications on File Prior to Visit   Medication Sig Dispense Refill    valACYclovir (VALTREX) 1 g tablet Take 1 tablet by mouth daily 30 tablet 0    venlafaxine (EFFEXOR XR) 150 MG extended release capsule Take 1 capsule by mouth daily Please call doctors office for appointment 30 capsule 0    Multiple Vitamins-Minerals (MULTI ADULT GUMMIES PO) Take 1 tablet by mouth daily       No current facility-administered medications on file prior to visit.      Past Surgical History:   Procedure Laterality Date    CERVICAL DISCECTOMY      HAND SURGERY Right     plate in her right hand    [place] COLON CA SCRN NOT HI RSK IND N/A 2018    COLONOSCOPY performed by Марина Ford MD at [address] [place] [address],[address] History   Problem Relation Age of Onset    Depression Mother     Cancer Mother         COLON    Colon Cancer Mother     Heart Attack Paternal Grandmother     Colon Cancer Paternal Grandfather     Breast Cancer Neg Hx     Diabetes Neg Hx     Eclampsia Neg Hx     Hypertension Neg Hx     Ovarian Cancer Neg Hx      Labor Neg Hx     Spont Abortions Neg Hx     Stroke Neg Hx      Social History     Socioeconomic History    Marital status:      Spouse name: Evaristo Mathews    Number of children: Not on file    Years of education: Not on file    Highest education level: Not on file   Occupational History     Employer: [address] [place] [address] [employer]    Tobacco Use    Smoking status: Every Day     Packs/day: 1.00     Years: 30.00     Pack years: 30.00     Types: Cigarettes     Start date: 1/1/1987    Smokeless tobacco: Never   Vaping Use    Vaping Use: Never used   Substance and Sexual Activity    Alcohol use: No     Comment: occasional    Drug use: No    Sexual activity: Not on file   Other Topics Concern    Not on file   Social History Narrative    Not on file     Social Determinants of Health     Financial Resource Strain: Low Risk     Difficulty of Paying Living Expenses: Not hard at all   Food Insecurity: No Food Insecurity    Worried About Running Out of Food in the Last Year: Never true    Ran Out of Food in the Last Year: Never true   Transportation Needs: Not on file   Physical Activity: Not on file   Stress: Not on file   Social Connections: Not on file   Intimate Partner Violence: Not on file   Housing Stability: Not on file     Allergies:  Patient has no known allergies. Review of Systems   Constitutional: Negative. Negative for chills, diaphoresis, fatigue and fever. HENT: Negative. Negative for congestion, sore throat and trouble swallowing. Eyes: Negative. Respiratory:  Negative for cough, chest tightness, shortness of breath and wheezing. Cardiovascular:  Negative for chest pain, palpitations and leg swelling. Gastrointestinal:  Negative for abdominal pain, blood in stool, constipation, diarrhea, nausea and vomiting. Endocrine: Negative. Genitourinary: Negative. Negative for difficulty urinating. Musculoskeletal:  Negative for arthralgias, gait problem, joint swelling and myalgias. Skin: Negative. Neurological:  Negative for dizziness, syncope, speech difficulty, weakness, light-headedness and headaches. Psychiatric/Behavioral:  Positive for dysphoric mood. Negative for self-injury, sleep disturbance and suicidal ideas. The patient is nervous/anxious.       Objective:    BP (!) 92/58 (Site: Right Upper Arm, Position: Sitting, Cuff Size: Medium Adult)   Pulse 91   Temp 97.9 °F (36.6 °C) (Infrared)   Resp 16   Ht 5' 6\" (1.676 m)   Wt 145 lb (65.8 kg)   LMP 08/23/2019 (Approximate)   SpO2 98%   BMI 23.40 kg/m²     Physical Exam  Constitutional:       General: She is not in acute distress. Appearance: She is well-developed. She is not diaphoretic. HENT:      Head: Normocephalic and atraumatic. Right Ear: External ear normal.      Left Ear: External ear normal.      Mouth/Throat:      Mouth: Mucous membranes are moist.   Eyes:      Extraocular Movements: Extraocular movements intact. Conjunctiva/sclera: Conjunctivae normal.      Pupils: Pupils are equal, round, and reactive to light. Cardiovascular:      Rate and Rhythm: Normal rate and regular rhythm. Heart sounds: Normal heart sounds. Pulmonary:      Effort: Pulmonary effort is normal. No respiratory distress. Breath sounds: Normal breath sounds. No wheezing. Abdominal:      General: Bowel sounds are normal.      Palpations: Abdomen is soft. Tenderness: There is no abdominal tenderness. Musculoskeletal:         General: Normal range of motion. Cervical back: Normal range of motion and neck supple. No tenderness. Right lower leg: No edema. Left lower leg: No edema. Lymphadenopathy:      Cervical: No cervical adenopathy. Skin:     General: Skin is warm and dry. Findings: No rash. Neurological:      General: No focal deficit present. Mental Status: She is alert and oriented to person, place, and time. Psychiatric:         Mood and Affect: Mood normal.         Behavior: Behavior normal.         Thought Content: Thought content normal.         Judgment: Judgment normal.       Assessment & Plan:       Diagnosis Orders   1. Annual physical exam  CBC with Auto Differential    Comprehensive Metabolic Panel    Lipid Panel    Hemoglobin A1C      2.  Anxiety  buPROPion (WELLBUTRIN XL) 300 MG extended release tablet      3. Depression, unspecified depression type  buPROPion (WELLBUTRIN XL) 300 MG extended release tablet        Genesignt testing sent today    Orders Placed This Encounter   Procedures    CBC with Auto Differential     Standing Status:   Future     Standing Expiration Date:   11/1/2023    Comprehensive Metabolic Panel     Standing Status:   Future     Standing Expiration Date:   11/1/2023    Lipid Panel     Standing Status:   Future     Standing Expiration Date:   11/1/2023     Order Specific Question:   Is Patient Fasting?/# of Hours     Answer:   yes    Hemoglobin A1C     Standing Status:   Future     Standing Expiration Date:   11/1/2023       Orders Placed This Encounter   Medications    buPROPion (WELLBUTRIN XL) 300 MG extended release tablet     Sig: Take 1 tablet by mouth every morning TAKE 1 TABLET BY MOUTH ONCE DAILY IN THE MORNING     Dispense:  30 tablet     Refill:  2       Medications Discontinued During This Encounter   Medication Reason    buPROPion (WELLBUTRIN XL) 150 MG extended release tablet REORDER     Return in about 4 weeks (around 11/29/2022). Reviewed with the patient: currentclinical status, medications, activities and diet. Side effects, adverse effects of the medicationprescribed today, as well as treatment plan/ rationale and result expectations havebeen discussed with the patient who expresses understanding and desires to proceed. Pt instructions reviewed and given to patient. Close follow up to evaluate treatment resultsand for coordination of care. I have reviewed the patient's medical historyin detail and updated the computerized patient record.     DANIEL Marie - CNP

## 2022-11-14 DIAGNOSIS — F41.9 ANXIETY: ICD-10-CM

## 2022-11-14 DIAGNOSIS — F32.A DEPRESSION, UNSPECIFIED DEPRESSION TYPE: ICD-10-CM

## 2022-11-14 RX ORDER — VENLAFAXINE HYDROCHLORIDE 150 MG/1
CAPSULE, EXTENDED RELEASE ORAL
Qty: 30 CAPSULE | Refills: 0 | Status: SHIPPED | OUTPATIENT
Start: 2022-11-14 | End: 2022-11-29 | Stop reason: SDUPTHER

## 2022-11-14 RX ORDER — BUPROPION HYDROCHLORIDE 150 MG/1
TABLET ORAL
Qty: 30 TABLET | Refills: 0 | OUTPATIENT
Start: 2022-11-14

## 2022-11-29 ENCOUNTER — OFFICE VISIT (OUTPATIENT)
Dept: FAMILY MEDICINE CLINIC | Age: 52
End: 2022-11-29
Payer: COMMERCIAL

## 2022-11-29 VITALS
OXYGEN SATURATION: 98 % | SYSTOLIC BLOOD PRESSURE: 100 MMHG | HEIGHT: 66 IN | BODY MASS INDEX: 22.98 KG/M2 | DIASTOLIC BLOOD PRESSURE: 64 MMHG | HEART RATE: 81 BPM | RESPIRATION RATE: 16 BRPM | TEMPERATURE: 97.7 F | WEIGHT: 143 LBS

## 2022-11-29 DIAGNOSIS — F32.A DEPRESSION, UNSPECIFIED DEPRESSION TYPE: ICD-10-CM

## 2022-11-29 DIAGNOSIS — Z87.891 PERSONAL HISTORY OF TOBACCO USE: ICD-10-CM

## 2022-11-29 DIAGNOSIS — Z12.31 ENCOUNTER FOR SCREENING MAMMOGRAM FOR MALIGNANT NEOPLASM OF BREAST: ICD-10-CM

## 2022-11-29 DIAGNOSIS — F41.9 ANXIETY: Primary | ICD-10-CM

## 2022-11-29 PROCEDURE — 99214 OFFICE O/P EST MOD 30 MIN: CPT | Performed by: NURSE PRACTITIONER

## 2022-11-29 PROCEDURE — G0296 VISIT TO DETERM LDCT ELIG: HCPCS | Performed by: NURSE PRACTITIONER

## 2022-11-29 PROCEDURE — G8427 DOCREV CUR MEDS BY ELIG CLIN: HCPCS | Performed by: NURSE PRACTITIONER

## 2022-11-29 PROCEDURE — G8482 FLU IMMUNIZE ORDER/ADMIN: HCPCS | Performed by: NURSE PRACTITIONER

## 2022-11-29 PROCEDURE — 3017F COLORECTAL CA SCREEN DOC REV: CPT | Performed by: NURSE PRACTITIONER

## 2022-11-29 PROCEDURE — G8420 CALC BMI NORM PARAMETERS: HCPCS | Performed by: NURSE PRACTITIONER

## 2022-11-29 PROCEDURE — 4004F PT TOBACCO SCREEN RCVD TLK: CPT | Performed by: NURSE PRACTITIONER

## 2022-11-29 RX ORDER — BUPROPION HYDROCHLORIDE 300 MG/1
300 TABLET ORAL EVERY MORNING
Qty: 30 TABLET | Refills: 5 | Status: SHIPPED | OUTPATIENT
Start: 2022-11-29

## 2022-11-29 RX ORDER — VENLAFAXINE HYDROCHLORIDE 150 MG/1
150 CAPSULE, EXTENDED RELEASE ORAL DAILY
Qty: 30 CAPSULE | Refills: 5 | Status: SHIPPED | OUTPATIENT
Start: 2022-11-29

## 2022-11-29 ASSESSMENT — ENCOUNTER SYMPTOMS
TROUBLE SWALLOWING: 0
COUGH: 0
VOMITING: 0
SHORTNESS OF BREATH: 0
ABDOMINAL PAIN: 0
EYES NEGATIVE: 1
CONSTIPATION: 0
SORE THROAT: 0
DIARRHEA: 0
WHEEZING: 0
NAUSEA: 0
CHEST TIGHTNESS: 0
BLOOD IN STOOL: 0

## 2022-11-29 NOTE — PATIENT INSTRUCTIONS

## 2022-11-29 NOTE — PROGRESS NOTES
Subjective:     Patient ID: José Livingston is a 46 y.o. female who presentstoday for:  Chief Complaint   Patient presents with    Discuss Labs     2022. Depression     Patient is here for a f/u on Depression. Patient states she had an increase in her medication and it's helping. DepressionPatient is not experiencing: nervousness/anxiety, palpitations, shortness of breath and suicidal ideas. 4 week f/u anxiety and depression  Doing better with medication adjustments  No adverse effects  Past Medical History:   Diagnosis Date    Depression     Genital herpes      Current Outpatient Medications on File Prior to Visit   Medication Sig Dispense Refill    valACYclovir (VALTREX) 1 g tablet Take 1 tablet by mouth daily 30 tablet 0    Multiple Vitamins-Minerals (MULTI ADULT GUMMIES PO) Take 1 tablet by mouth daily       No current facility-administered medications on file prior to visit.      Past Surgical History:   Procedure Laterality Date    CERVICAL DISCECTOMY      HAND SURGERY Right     plate in her right hand    [place] COLON CA SCRN NOT HI RSK IND N/A 2018    COLONOSCOPY performed by Darlene Gonzalez MD at [address] [place] [address],[address] History   Problem Relation Age of Onset    Depression Mother     Cancer Mother         COLON    Colon Cancer Mother     Heart Attack Paternal Grandmother     Colon Cancer Paternal Grandfather     Breast Cancer Neg Hx     Diabetes Neg Hx     Eclampsia Neg Hx     Hypertension Neg Hx     Ovarian Cancer Neg Hx      Labor Neg Hx     Spont Abortions Neg Hx     Stroke Neg Hx      Social History     Socioeconomic History    Marital status:      Spouse name: Julia Guo    Number of children: Not on file    Years of education: Not on file    Highest education level: Not on file   Occupational History     Employer: [address] [place] [address]    Tobacco Use    Smoking status: Every Day     Packs/day: 1.00     Years: 30.00     Pack years: 30. 00     Types: Cigarettes     Start date: 1/1/1987    Smokeless tobacco: Never   Vaping Use    Vaping Use: Never used   Substance and Sexual Activity    Alcohol use: No     Comment: occasional    Drug use: No    Sexual activity: Not on file   Other Topics Concern    Not on file   Social History Narrative    Not on file     Social Determinants of Health     Financial Resource Strain: Low Risk     Difficulty of Paying Living Expenses: Not hard at all   Food Insecurity: No Food Insecurity    Worried About Running Out of Food in the Last Year: Never true    Ran Out of Food in the Last Year: Never true   Transportation Needs: Not on file   Physical Activity: Not on file   Stress: Not on file   Social Connections: Not on file   Intimate Partner Violence: Not on file   Housing Stability: Not on file     Allergies:  Patient has no known allergies. Review of Systems   Constitutional: Negative. Negative for chills, diaphoresis, fatigue and fever. HENT: Negative. Negative for congestion, sore throat and trouble swallowing. Eyes: Negative. Respiratory:  Negative for cough, chest tightness, shortness of breath and wheezing. Cardiovascular:  Negative for chest pain, palpitations and leg swelling. Gastrointestinal:  Negative for abdominal pain, blood in stool, constipation, diarrhea, nausea and vomiting. Endocrine: Negative. Genitourinary: Negative. Negative for difficulty urinating. Musculoskeletal:  Negative for arthralgias, gait problem, joint swelling and myalgias. Skin: Negative. Neurological:  Negative for dizziness, syncope, speech difficulty, weakness, light-headedness and headaches. Psychiatric/Behavioral:  Positive for depression. Negative for dysphoric mood, self-injury, sleep disturbance and suicidal ideas. The patient is not nervous/anxious.       Objective:    /64 (Site: Left Upper Arm, Position: Sitting, Cuff Size: Medium Adult)   Pulse 81   Temp 97.7 °F (36.5 °C) (Infrared) Resp 16   Ht 5' 6\" (1.676 m)   Wt 143 lb (64.9 kg)   LMP 08/23/2019 (Approximate)   SpO2 98%   BMI 23.08 kg/m²     Physical Exam  Constitutional:       General: She is not in acute distress. Appearance: She is well-developed. She is not diaphoretic. HENT:      Head: Normocephalic and atraumatic. Mouth/Throat:      Mouth: Mucous membranes are moist.   Eyes:      Conjunctiva/sclera: Conjunctivae normal.   Cardiovascular:      Rate and Rhythm: Normal rate and regular rhythm. Heart sounds: Normal heart sounds. Pulmonary:      Effort: Pulmonary effort is normal. No respiratory distress. Breath sounds: Normal breath sounds. No wheezing. Musculoskeletal:         General: Normal range of motion. Cervical back: Normal range of motion and neck supple. No tenderness. Right lower leg: No edema. Left lower leg: No edema. Lymphadenopathy:      Cervical: No cervical adenopathy. Skin:     General: Skin is warm and dry. Findings: No rash. Neurological:      General: No focal deficit present. Mental Status: She is alert and oriented to person, place, and time. Psychiatric:         Mood and Affect: Mood normal.         Speech: Speech normal.         Behavior: Behavior normal.         Thought Content: Thought content normal. Thought content is not paranoid. Thought content does not include homicidal or suicidal ideation. Judgment: Judgment normal.       Assessment & Plan:       Diagnosis Orders   1. Anxiety  venlafaxine (EFFEXOR XR) 150 MG extended release capsule    buPROPion (WELLBUTRIN XL) 300 MG extended release tablet      2. Depression, unspecified depression type  venlafaxine (EFFEXOR XR) 150 MG extended release capsule    buPROPion (WELLBUTRIN XL) 300 MG extended release tablet      3.  Encounter for screening mammogram for malignant neoplasm of breast  BONI DIGITAL SCREEN W OR WO CAD BILATERAL      4. Personal history of tobacco use  MA VISIT TO DISCUSS LUNG CA SCREEN W LDCT    CT Lung Screen (Annual)        Orders Placed This Encounter   Procedures    BONI DIGITAL SCREEN W OR WO CAD BILATERAL     Standing Status:   Future     Standing Expiration Date:   11/29/2023     Order Specific Question:   Reason for exam:     Answer:   screening for breast cancer    CT Lung Screen (Annual)     Age: Patient is 46 y.o. Smoking History: Social History    Tobacco Use      Smoking status: Every Day        Packs/day: 1.00        Years: 30.00        Pack years: 30        Types: Cigarettes        Start date: 1/1/1987      Smokeless tobacco: Never    Vaping Use      Vaping Use: Never used    Alcohol use: No      Comment: occasional    Drug use: No   Pack years: 30    Date of last lung cancer screening: No previous lung cancer screening exam     Standing Status:   Future     Standing Expiration Date:   5/29/2024     Order Specific Question:   Is there documentation of shared decision making? Answer:   Yes     Order Specific Question:   Is this a low dose CT or a routine CT? Answer:   Low Dose CT [1]     Order Specific Question:   Is this the first (baseline) CT or an annual exam?     Answer:   Baseline [1]     Order Specific Question:   Does the patient show any signs or symptoms of lung cancer? Answer:   No     Order Specific Question:   Smoking Status? Answer:   Every Day [1]     Order Specific Question:   Smoking packs per day? Answer:   1     Order Specific Question:   Years smoking?      Answer:   30    TX VISIT TO DISCUSS LUNG CA SCREEN W LDCT     Orders Placed This Encounter   Medications    venlafaxine (EFFEXOR XR) 150 MG extended release capsule     Sig: Take 1 capsule by mouth daily     Dispense:  30 capsule     Refill:  5    buPROPion (WELLBUTRIN XL) 300 MG extended release tablet     Sig: Take 1 tablet by mouth every morning TAKE 1 TABLET BY MOUTH ONCE DAILY IN THE MORNING     Dispense:  30 tablet     Refill:  5     Medications Discontinued During This Encounter   Medication Reason    buPROPion (WELLBUTRIN XL) 300 MG extended release tablet REORDER    venlafaxine (EFFEXOR XR) 150 MG extended release capsule REORDER     Return in about 3 months (around 2/28/2023). Reviewed with the patient: currentclinical status, medications, activities and diet. Side effects, adverse effects of the medicationprescribed today, as well as treatment plan/ rationale and result expectations havebeen discussed with the patient who expresses understanding and desires to proceed. Pt instructions reviewed and given to patient. Close follow up to evaluate treatment resultsand for coordination of care. I have reviewed the patient's medical historyin detail and updated the computerized patient record. DANIEL Mcgee CNP                  Discussed with the patient the current USPSTF guidelines released March 9, 2021 for screening for lung cancer. For adults aged 48 to [de-identified] years who have a 20 pack-year smoking history and currently smoke or have quit within the past 15 years the grade B recommendation is to:  Screen for lung cancer with low-dose computed tomography (LDCT) every year. Stop screening once a person has not smoked for 15 years or has a health problem that limits life expectancy or the ability to have lung surgery. The patient  reports that she has been smoking cigarettes. She started smoking about 35 years ago. She has a 30.00 pack-year smoking history. She has never used smokeless tobacco.. Discussed with patient the risks and benefits of screening, including over-diagnosis, false positive rate, and total radiation exposure. The patient currently exhibits no signs or symptoms suggestive of lung cancer. Discussed with patient the importance of compliance with yearly annual lung cancer screenings and willingness to undergo diagnosis and treatment if screening scan is positive.   In addition, the patient was counseled regarding the importance of remaining smoke free and/or total smoking cessation.     Also reviewed the following if the patient has Medicare that as of February 10, 2022, Medicare only covers LDCT screening in patients aged 51-72 with at least a 20 pack-year smoking history who currently smoke or have quit in the last 15 years

## 2022-12-15 ENCOUNTER — HOSPITAL ENCOUNTER (OUTPATIENT)
Dept: WOMENS IMAGING | Age: 52
Discharge: HOME | End: 2022-12-17
Payer: COMMERCIAL

## 2022-12-15 DIAGNOSIS — Z12.31 ENCOUNTER FOR SCREENING MAMMOGRAM FOR MALIGNANT NEOPLASM OF BREAST: ICD-10-CM

## 2022-12-15 PROCEDURE — 77063 BREAST TOMOSYNTHESIS BI: CPT

## 2023-01-09 RX ORDER — VALACYCLOVIR HYDROCHLORIDE 1 G/1
1000 TABLET, FILM COATED ORAL DAILY
Qty: 30 TABLET | Refills: 2 | Status: SHIPPED | OUTPATIENT
Start: 2023-01-09

## 2023-01-09 NOTE — TELEPHONE ENCOUNTER
requesting medication refill.  Please approve or deny this request.    Rx requested:  Requested Prescriptions     Pending Prescriptions Disp Refills    valACYclovir (VALTREX) 1 g tablet 30 tablet 2     Sig: Take 1 tablet by mouth daily         Last Office Visit:   11/29/2022      Next Visit Date:  Future Appointments   Date Time Provider [place]   2/28/2023  9:30 AM Margareth Case, [address]

## 2023-03-14 RX ORDER — VALACYCLOVIR HYDROCHLORIDE 1 G/1
1000 TABLET, FILM COATED ORAL DAILY
Qty: 30 TABLET | Refills: 2 | Status: SHIPPED | OUTPATIENT
Start: 2023-03-14

## 2023-03-14 NOTE — TELEPHONE ENCOUNTER
Patient requesting medication refill. Please approve or deny this request.    Rx requested:  Requested Prescriptions     Pending Prescriptions Disp Refills    valACYclovir (VALTREX) 1 g tablet 30 tablet 2     Sig: Take 1 tablet by mouth daily         Last Office Visit:   11/29/2022      Next Visit Date:  No future appointments.

## 2023-06-04 DIAGNOSIS — F41.9 ANXIETY: ICD-10-CM

## 2023-06-04 DIAGNOSIS — F32.A DEPRESSION, UNSPECIFIED DEPRESSION TYPE: ICD-10-CM

## 2023-06-05 RX ORDER — VENLAFAXINE HYDROCHLORIDE 150 MG/1
CAPSULE, EXTENDED RELEASE ORAL
Qty: 30 CAPSULE | Refills: 5 | Status: SHIPPED | OUTPATIENT
Start: 2023-06-05

## 2023-06-05 RX ORDER — BUPROPION HYDROCHLORIDE 300 MG/1
TABLET ORAL
Qty: 30 TABLET | Refills: 5 | Status: SHIPPED | OUTPATIENT
Start: 2023-06-05

## 2023-06-30 RX ORDER — VALACYCLOVIR HYDROCHLORIDE 1 G/1
TABLET, FILM COATED ORAL
Qty: 30 TABLET | Refills: 2 | Status: SHIPPED | OUTPATIENT
Start: 2023-06-30

## 2023-10-26 RX ORDER — VALACYCLOVIR HYDROCHLORIDE 1 G/1
TABLET, FILM COATED ORAL
Qty: 30 TABLET | Refills: 0 | Status: SHIPPED | OUTPATIENT
Start: 2023-10-26

## 2023-10-27 DIAGNOSIS — F32.A DEPRESSION, UNSPECIFIED DEPRESSION TYPE: ICD-10-CM

## 2023-10-27 DIAGNOSIS — F41.9 ANXIETY: ICD-10-CM

## 2023-11-13 RX ORDER — BUPROPION HYDROCHLORIDE 300 MG/1
TABLET ORAL
Qty: 30 TABLET | Refills: 0 | Status: SHIPPED | OUTPATIENT
Start: 2023-11-13

## 2023-11-13 RX ORDER — VENLAFAXINE HYDROCHLORIDE 150 MG/1
CAPSULE, EXTENDED RELEASE ORAL
Qty: 30 CAPSULE | Refills: 0 | Status: SHIPPED | OUTPATIENT
Start: 2023-11-13

## 2023-11-29 SDOH — ECONOMIC STABILITY: HOUSING INSECURITY
IN THE LAST 12 MONTHS, WAS THERE A TIME WHEN YOU DID NOT HAVE A STEADY PLACE TO SLEEP OR SLEPT IN A SHELTER (INCLUDING NOW)?: NO

## 2023-11-29 SDOH — ECONOMIC STABILITY: FOOD INSECURITY: WITHIN THE PAST 12 MONTHS, YOU WORRIED THAT YOUR FOOD WOULD RUN OUT BEFORE YOU GOT MONEY TO BUY MORE.: NEVER TRUE

## 2023-11-29 SDOH — ECONOMIC STABILITY: FOOD INSECURITY: WITHIN THE PAST 12 MONTHS, THE FOOD YOU BOUGHT JUST DIDN'T LAST AND YOU DIDN'T HAVE MONEY TO GET MORE.: NEVER TRUE

## 2023-11-29 SDOH — ECONOMIC STABILITY: TRANSPORTATION INSECURITY
IN THE PAST 12 MONTHS, HAS LACK OF TRANSPORTATION KEPT YOU FROM MEETINGS, WORK, OR FROM GETTING THINGS NEEDED FOR DAILY LIVING?: NO

## 2023-11-29 SDOH — ECONOMIC STABILITY: INCOME INSECURITY: HOW HARD IS IT FOR YOU TO PAY FOR THE VERY BASICS LIKE FOOD, HOUSING, MEDICAL CARE, AND HEATING?: NOT HARD AT ALL

## 2023-11-30 ENCOUNTER — TELEMEDICINE (OUTPATIENT)
Dept: FAMILY MEDICINE CLINIC | Age: 53
End: 2023-11-30
Payer: COMMERCIAL

## 2023-11-30 DIAGNOSIS — F41.9 ANXIETY: Primary | ICD-10-CM

## 2023-11-30 DIAGNOSIS — R41.840 LACK OF CONCENTRATION: ICD-10-CM

## 2023-11-30 DIAGNOSIS — F32.A DEPRESSION, UNSPECIFIED DEPRESSION TYPE: ICD-10-CM

## 2023-11-30 DIAGNOSIS — A60.00 GENITAL HERPES SIMPLEX, UNSPECIFIED SITE: ICD-10-CM

## 2023-11-30 PROCEDURE — 99213 OFFICE O/P EST LOW 20 MIN: CPT | Performed by: PHYSICIAN ASSISTANT

## 2023-11-30 PROCEDURE — G8421 BMI NOT CALCULATED: HCPCS | Performed by: PHYSICIAN ASSISTANT

## 2023-11-30 PROCEDURE — G8484 FLU IMMUNIZE NO ADMIN: HCPCS | Performed by: PHYSICIAN ASSISTANT

## 2023-11-30 PROCEDURE — 3017F COLORECTAL CA SCREEN DOC REV: CPT | Performed by: PHYSICIAN ASSISTANT

## 2023-11-30 PROCEDURE — G8427 DOCREV CUR MEDS BY ELIG CLIN: HCPCS | Performed by: PHYSICIAN ASSISTANT

## 2023-11-30 PROCEDURE — 4004F PT TOBACCO SCREEN RCVD TLK: CPT | Performed by: PHYSICIAN ASSISTANT

## 2023-11-30 RX ORDER — VENLAFAXINE HYDROCHLORIDE 150 MG/1
150 CAPSULE, EXTENDED RELEASE ORAL DAILY
Qty: 30 CAPSULE | Refills: 5 | Status: SHIPPED | OUTPATIENT
Start: 2023-11-30

## 2023-11-30 RX ORDER — VALACYCLOVIR HYDROCHLORIDE 1 G/1
1000 TABLET, FILM COATED ORAL DAILY
Qty: 30 TABLET | Refills: 5 | Status: SHIPPED | OUTPATIENT
Start: 2023-11-30

## 2023-11-30 RX ORDER — BUPROPION HYDROCHLORIDE 300 MG/1
300 TABLET ORAL EVERY MORNING
Qty: 30 TABLET | Refills: 5 | Status: SHIPPED | OUTPATIENT
Start: 2023-11-30

## 2023-11-30 ASSESSMENT — PATIENT HEALTH QUESTIONNAIRE - PHQ9
3. TROUBLE FALLING OR STAYING ASLEEP: 0
1. LITTLE INTEREST OR PLEASURE IN DOING THINGS: 0
8. MOVING OR SPEAKING SO SLOWLY THAT OTHER PEOPLE COULD HAVE NOTICED. OR THE OPPOSITE, BEING SO FIGETY OR RESTLESS THAT YOU HAVE BEEN MOVING AROUND A LOT MORE THAN USUAL: 0
5. POOR APPETITE OR OVEREATING: 0
9. THOUGHTS THAT YOU WOULD BE BETTER OFF DEAD, OR OF HURTING YOURSELF: 0
SUM OF ALL RESPONSES TO PHQ QUESTIONS 1-9: 0
2. FEELING DOWN, DEPRESSED OR HOPELESS: 0
SUM OF ALL RESPONSES TO PHQ QUESTIONS 1-9: 0
SUM OF ALL RESPONSES TO PHQ9 QUESTIONS 1 & 2: 0
10. IF YOU CHECKED OFF ANY PROBLEMS, HOW DIFFICULT HAVE THESE PROBLEMS MADE IT FOR YOU TO DO YOUR WORK, TAKE CARE OF THINGS AT HOME, OR GET ALONG WITH OTHER PEOPLE: 0
SUM OF ALL RESPONSES TO PHQ QUESTIONS 1-9: 0
7. TROUBLE CONCENTRATING ON THINGS, SUCH AS READING THE NEWSPAPER OR WATCHING TELEVISION: 0
4. FEELING TIRED OR HAVING LITTLE ENERGY: 0
6. FEELING BAD ABOUT YOURSELF - OR THAT YOU ARE A FAILURE OR HAVE LET YOURSELF OR YOUR FAMILY DOWN: 0
SUM OF ALL RESPONSES TO PHQ QUESTIONS 1-9: 0

## 2023-11-30 ASSESSMENT — COLUMBIA-SUICIDE SEVERITY RATING SCALE - C-SSRS
4. HAVE YOU HAD THESE THOUGHTS AND HAD SOME INTENTION OF ACTING ON THEM?: NO
5. HAVE YOU STARTED TO WORK OUT OR WORKED OUT THE DETAILS OF HOW TO KILL YOURSELF? DO YOU INTEND TO CARRY OUT THIS PLAN?: NO
7. DID THIS OCCUR IN THE LAST THREE MONTHS: NO
3. HAVE YOU BEEN THINKING ABOUT HOW YOU MIGHT KILL YOURSELF?: NO

## 2023-11-30 NOTE — PROGRESS NOTES
MG extended release tablet REORDER     Return for follow up with your PCP as directed.     Tricia Vaughn PA-C    --DIOR NguyenC

## 2024-06-05 DIAGNOSIS — A60.00 GENITAL HERPES SIMPLEX, UNSPECIFIED SITE: ICD-10-CM

## 2024-06-06 RX ORDER — VALACYCLOVIR HYDROCHLORIDE 1 G/1
1000 TABLET, FILM COATED ORAL DAILY
Qty: 30 TABLET | Refills: 5 | Status: SHIPPED | OUTPATIENT
Start: 2024-06-06

## 2024-07-01 DIAGNOSIS — F41.9 ANXIETY: ICD-10-CM

## 2024-07-01 DIAGNOSIS — F32.A DEPRESSION, UNSPECIFIED DEPRESSION TYPE: ICD-10-CM

## 2024-07-01 RX ORDER — VENLAFAXINE HYDROCHLORIDE 150 MG/1
150 CAPSULE, EXTENDED RELEASE ORAL DAILY
Qty: 30 CAPSULE | Refills: 0 | Status: SHIPPED | OUTPATIENT
Start: 2024-07-01

## 2024-07-01 RX ORDER — BUPROPION HYDROCHLORIDE 300 MG/1
300 TABLET ORAL EVERY MORNING
Qty: 30 TABLET | Refills: 0 | Status: SHIPPED | OUTPATIENT
Start: 2024-07-01

## 2024-08-02 DIAGNOSIS — F41.9 ANXIETY: ICD-10-CM

## 2024-08-02 DIAGNOSIS — F32.A DEPRESSION, UNSPECIFIED DEPRESSION TYPE: ICD-10-CM

## 2024-08-02 RX ORDER — BUPROPION HYDROCHLORIDE 300 MG/1
300 TABLET ORAL EVERY MORNING
Qty: 30 TABLET | Refills: 0 | Status: SHIPPED | OUTPATIENT
Start: 2024-08-02

## 2024-08-02 RX ORDER — VENLAFAXINE HYDROCHLORIDE 150 MG/1
150 CAPSULE, EXTENDED RELEASE ORAL DAILY
Qty: 30 CAPSULE | Refills: 0 | Status: SHIPPED | OUTPATIENT
Start: 2024-08-02

## 2024-08-02 NOTE — TELEPHONE ENCOUNTER
Patient is requesting medication refill. Please approve or deny this request.    Rx requested:  Requested Prescriptions     Pending Prescriptions Disp Refills    buPROPion (WELLBUTRIN XL) 300 MG extended release tablet [Pharmacy Med Name: buPROPion HCl ER (XL) 300 MG Oral Tablet Extended Release 24 Hour] 30 tablet 0     Sig: TAKE 1 TABLET BY MOUTH ONCE DAILY IN THE MORNING    venlafaxine (EFFEXOR XR) 150 MG extended release capsule [Pharmacy Med Name: Venlafaxine HCl  MG Oral Capsule Extended Release 24 Hour] 30 capsule 0     Sig: Take 1 capsule by mouth once daily         Last Office Visit:   11/30/2023      Next Visit Date:  No future appointments.

## 2024-08-31 DIAGNOSIS — F32.A DEPRESSION, UNSPECIFIED DEPRESSION TYPE: ICD-10-CM

## 2024-08-31 DIAGNOSIS — F41.9 ANXIETY: ICD-10-CM

## 2024-08-31 NOTE — TELEPHONE ENCOUNTER
Pharmacy requesting medication refill. Please approve or deny this request.    Rx requested:  Requested Prescriptions     Pending Prescriptions Disp Refills    venlafaxine (EFFEXOR XR) 150 MG extended release capsule [Pharmacy Med Name: Venlafaxine HCl  MG Oral Capsule Extended Release 24 Hour] 30 capsule 0     Sig: Take 1 capsule by mouth once daily         Last Office Visit:   11/30/2023      Next Visit Date:  No future appointments.

## 2024-09-03 RX ORDER — VENLAFAXINE HYDROCHLORIDE 150 MG/1
150 CAPSULE, EXTENDED RELEASE ORAL DAILY
Qty: 90 CAPSULE | Refills: 1 | Status: SHIPPED | OUTPATIENT
Start: 2024-09-03

## 2024-09-28 DIAGNOSIS — F41.9 ANXIETY: ICD-10-CM

## 2024-09-28 DIAGNOSIS — F32.A DEPRESSION, UNSPECIFIED DEPRESSION TYPE: ICD-10-CM

## 2024-09-29 NOTE — TELEPHONE ENCOUNTER
requesting medication refill. Please approve or deny this request.    Rx requested:  Requested Prescriptions     Pending Prescriptions Disp Refills    buPROPion (WELLBUTRIN XL) 300 MG extended release tablet [Pharmacy Med Name: buPROPion HCl ER (XL) 300 MG Oral Tablet Extended Release 24 Hour] 30 tablet 0     Sig: TAKE 1 TABLET BY MOUTH ONCE DAILY IN THE MORNING         Last Office Visit:   Visit date not found      Next Visit Date:  No future appointments.

## 2024-09-30 RX ORDER — BUPROPION HYDROCHLORIDE 300 MG/1
300 TABLET ORAL EVERY MORNING
Qty: 30 TABLET | Refills: 0 | Status: SHIPPED | OUTPATIENT
Start: 2024-09-30 | End: 2024-10-15 | Stop reason: SDUPTHER

## 2024-10-07 DIAGNOSIS — F41.9 ANXIETY: ICD-10-CM

## 2024-10-07 DIAGNOSIS — F32.A DEPRESSION, UNSPECIFIED DEPRESSION TYPE: ICD-10-CM

## 2024-10-07 RX ORDER — BUPROPION HYDROCHLORIDE 300 MG/1
300 TABLET ORAL EVERY MORNING
Qty: 30 TABLET | Refills: 0 | OUTPATIENT
Start: 2024-10-07

## 2024-10-15 ENCOUNTER — OFFICE VISIT (OUTPATIENT)
Dept: FAMILY MEDICINE CLINIC | Age: 54
End: 2024-10-15
Payer: COMMERCIAL

## 2024-10-15 VITALS
HEIGHT: 66 IN | HEART RATE: 90 BPM | SYSTOLIC BLOOD PRESSURE: 98 MMHG | TEMPERATURE: 97.7 F | RESPIRATION RATE: 16 BRPM | WEIGHT: 143 LBS | DIASTOLIC BLOOD PRESSURE: 60 MMHG | OXYGEN SATURATION: 98 % | BODY MASS INDEX: 22.98 KG/M2

## 2024-10-15 DIAGNOSIS — Z12.11 SCREEN FOR COLON CANCER: ICD-10-CM

## 2024-10-15 DIAGNOSIS — F33.1 MODERATE EPISODE OF RECURRENT MAJOR DEPRESSIVE DISORDER (HCC): ICD-10-CM

## 2024-10-15 DIAGNOSIS — F41.1 GAD (GENERALIZED ANXIETY DISORDER): ICD-10-CM

## 2024-10-15 DIAGNOSIS — R53.82 CHRONIC FATIGUE: ICD-10-CM

## 2024-10-15 DIAGNOSIS — Z00.00 ANNUAL PHYSICAL EXAM: ICD-10-CM

## 2024-10-15 DIAGNOSIS — R41.840 LACK OF CONCENTRATION: ICD-10-CM

## 2024-10-15 DIAGNOSIS — Z00.00 ANNUAL PHYSICAL EXAM: Primary | ICD-10-CM

## 2024-10-15 LAB
ALBUMIN SERPL-MCNC: 4.2 G/DL (ref 3.5–4.6)
ALP SERPL-CCNC: 75 U/L (ref 40–130)
ALT SERPL-CCNC: 13 U/L (ref 0–33)
ANION GAP SERPL CALCULATED.3IONS-SCNC: 10 MEQ/L (ref 9–15)
AST SERPL-CCNC: 28 U/L (ref 0–35)
BASOPHILS # BLD: 0.1 K/UL (ref 0–0.2)
BASOPHILS NFR BLD: 0.9 %
BILIRUB SERPL-MCNC: <0.2 MG/DL (ref 0.2–0.7)
BUN SERPL-MCNC: 12 MG/DL (ref 6–20)
CALCIUM SERPL-MCNC: 9.4 MG/DL (ref 8.5–9.9)
CHLORIDE SERPL-SCNC: 103 MEQ/L (ref 95–107)
CHOLEST SERPL-MCNC: 167 MG/DL (ref 0–199)
CO2 SERPL-SCNC: 25 MEQ/L (ref 20–31)
CREAT SERPL-MCNC: 0.82 MG/DL (ref 0.5–0.9)
EOSINOPHIL # BLD: 0.1 K/UL (ref 0–0.7)
EOSINOPHIL NFR BLD: 1.8 %
ERYTHROCYTE [DISTWIDTH] IN BLOOD BY AUTOMATED COUNT: 13.1 % (ref 11.5–14.5)
GLOBULIN SER CALC-MCNC: 2.8 G/DL (ref 2.3–3.5)
GLUCOSE SERPL-MCNC: 100 MG/DL (ref 70–99)
HCT VFR BLD AUTO: 38.1 % (ref 37–47)
HDLC SERPL-MCNC: 50 MG/DL (ref 40–59)
HGB BLD-MCNC: 13 G/DL (ref 12–16)
LDLC SERPL CALC-MCNC: 97 MG/DL (ref 0–129)
LYMPHOCYTES # BLD: 2.8 K/UL (ref 1–4.8)
LYMPHOCYTES NFR BLD: 40.5 %
MCH RBC QN AUTO: 32.3 PG (ref 27–31.3)
MCHC RBC AUTO-ENTMCNC: 34.1 % (ref 33–37)
MCV RBC AUTO: 94.8 FL (ref 79.4–94.8)
MONOCYTES # BLD: 0.6 K/UL (ref 0.2–0.8)
MONOCYTES NFR BLD: 9.3 %
NEUTROPHILS # BLD: 3.2 K/UL (ref 1.4–6.5)
NEUTS SEG NFR BLD: 47.2 %
PLATELET # BLD AUTO: 351 K/UL (ref 130–400)
POTASSIUM SERPL-SCNC: 4.1 MEQ/L (ref 3.4–4.9)
PROT SERPL-MCNC: 7 G/DL (ref 6.3–8)
RBC # BLD AUTO: 4.02 M/UL (ref 4.2–5.4)
SODIUM SERPL-SCNC: 138 MEQ/L (ref 135–144)
TRIGL SERPL-MCNC: 100 MG/DL (ref 0–150)
TSH SERPL-MCNC: 3.34 UIU/ML (ref 0.44–3.86)
WBC # BLD AUTO: 6.8 K/UL (ref 4.8–10.8)

## 2024-10-15 PROCEDURE — 99214 OFFICE O/P EST MOD 30 MIN: CPT | Performed by: NURSE PRACTITIONER

## 2024-10-15 PROCEDURE — 99396 PREV VISIT EST AGE 40-64: CPT | Performed by: NURSE PRACTITIONER

## 2024-10-15 RX ORDER — ATOMOXETINE 40 MG/1
40 CAPSULE ORAL DAILY
Qty: 30 CAPSULE | Refills: 1 | Status: SHIPPED | OUTPATIENT
Start: 2024-10-15

## 2024-10-15 RX ORDER — BUPROPION HYDROCHLORIDE 300 MG/1
300 TABLET ORAL EVERY MORNING
Qty: 30 TABLET | Refills: 1 | Status: SHIPPED | OUTPATIENT
Start: 2024-10-15

## 2024-10-15 RX ORDER — VENLAFAXINE HYDROCHLORIDE 75 MG/1
75 CAPSULE, EXTENDED RELEASE ORAL DAILY
Qty: 30 CAPSULE | Refills: 1 | Status: SHIPPED | OUTPATIENT
Start: 2024-10-15

## 2024-10-15 ASSESSMENT — PATIENT HEALTH QUESTIONNAIRE - PHQ9
SUM OF ALL RESPONSES TO PHQ QUESTIONS 1-9: 18
8. MOVING OR SPEAKING SO SLOWLY THAT OTHER PEOPLE COULD HAVE NOTICED. OR THE OPPOSITE, BEING SO FIGETY OR RESTLESS THAT YOU HAVE BEEN MOVING AROUND A LOT MORE THAN USUAL: SEVERAL DAYS
9. THOUGHTS THAT YOU WOULD BE BETTER OFF DEAD, OR OF HURTING YOURSELF: NOT AT ALL
2. FEELING DOWN, DEPRESSED OR HOPELESS: NEARLY EVERY DAY
3. TROUBLE FALLING OR STAYING ASLEEP: NEARLY EVERY DAY
6. FEELING BAD ABOUT YOURSELF - OR THAT YOU ARE A FAILURE OR HAVE LET YOURSELF OR YOUR FAMILY DOWN: MORE THAN HALF THE DAYS
10. IF YOU CHECKED OFF ANY PROBLEMS, HOW DIFFICULT HAVE THESE PROBLEMS MADE IT FOR YOU TO DO YOUR WORK, TAKE CARE OF THINGS AT HOME, OR GET ALONG WITH OTHER PEOPLE: VERY DIFFICULT
SUM OF ALL RESPONSES TO PHQ QUESTIONS 1-9: 18
4. FEELING TIRED OR HAVING LITTLE ENERGY: NEARLY EVERY DAY
1. LITTLE INTEREST OR PLEASURE IN DOING THINGS: NEARLY EVERY DAY
SUM OF ALL RESPONSES TO PHQ QUESTIONS 1-9: 18
7. TROUBLE CONCENTRATING ON THINGS, SUCH AS READING THE NEWSPAPER OR WATCHING TELEVISION: NEARLY EVERY DAY
5. POOR APPETITE OR OVEREATING: NOT AT ALL
SUM OF ALL RESPONSES TO PHQ QUESTIONS 1-9: 18
SUM OF ALL RESPONSES TO PHQ9 QUESTIONS 1 & 2: 6

## 2024-10-15 ASSESSMENT — ENCOUNTER SYMPTOMS
COUGH: 0
NAUSEA: 0
WHEEZING: 0
ABDOMINAL PAIN: 0
SHORTNESS OF BREATH: 0
EYES NEGATIVE: 1
SORE THROAT: 0
CHEST TIGHTNESS: 0
TROUBLE SWALLOWING: 0
VOMITING: 0
BLOOD IN STOOL: 0
DIARRHEA: 0
CONSTIPATION: 0

## 2024-10-15 ASSESSMENT — ANXIETY QUESTIONNAIRES
GAD7 TOTAL SCORE: 18
5. BEING SO RESTLESS THAT IT IS HARD TO SIT STILL: NOT AT ALL
7. FEELING AFRAID AS IF SOMETHING AWFUL MIGHT HAPPEN: NEARLY EVERY DAY
2. NOT BEING ABLE TO STOP OR CONTROL WORRYING: NEARLY EVERY DAY
6. BECOMING EASILY ANNOYED OR IRRITABLE: NEARLY EVERY DAY
1. FEELING NERVOUS, ANXIOUS, OR ON EDGE: NEARLY EVERY DAY
3. WORRYING TOO MUCH ABOUT DIFFERENT THINGS: NEARLY EVERY DAY
IF YOU CHECKED OFF ANY PROBLEMS ON THIS QUESTIONNAIRE, HOW DIFFICULT HAVE THESE PROBLEMS MADE IT FOR YOU TO DO YOUR WORK, TAKE CARE OF THINGS AT HOME, OR GET ALONG WITH OTHER PEOPLE: VERY DIFFICULT
4. TROUBLE RELAXING: NEARLY EVERY DAY

## 2024-10-15 ASSESSMENT — COLUMBIA-SUICIDE SEVERITY RATING SCALE - C-SSRS
2. HAVE YOU ACTUALLY HAD ANY THOUGHTS OF KILLING YOURSELF?: NO
6. HAVE YOU EVER DONE ANYTHING, STARTED TO DO ANYTHING, OR PREPARED TO DO ANYTHING TO END YOUR LIFE?: NO
1. WITHIN THE PAST MONTH, HAVE YOU WISHED YOU WERE DEAD OR WISHED YOU COULD GO TO SLEEP AND NOT WAKE UP?: NO

## 2024-10-15 NOTE — PROGRESS NOTES
[facility]  [facility]  [address] [facility] [address]  Dept: [phone]  Dept Fax: [phone]  Loc: [phone]     SUBJECTIVE    Barbi Katz (: 1970) is a 54 y.o. female, Established patient, here for evaluation of the following chief complaint(s):  Annual Exam (Patient is here for her annual physical exam. Patient is here with increased depression, states the Wellbutrin and Effexor aren't working very well. States the last year has been rough. Patient c/o foggy brain, can't think straight, overwhelmed- thinks she has ADD. )      PCP:  Breanna Ramirez APRN - CNP      History of Present Illness  The patient presents for her annual exam along with increased depression, anxiety, and inattention    She expresses concerns about her depression, anxiety, and attention impairment. She had a virtual appointment with YUSRA Echeverria who referred her to Psychiatry Department for Attention Deficit Disorder (ADD). However, she received a letter a month and a half later indicating that they were not accepting new patients. She was then referred to a specialist, but her insurance did not cover it. She reports feelings of depression, foggy brain, and forgetfulness. She is unsure if these symptoms could be related to her menopause or if she does have ADD. She feels down, depressed, or hopeless daily, sleeps too much, and feels tired or having little energy. She denies poor appetite or overeating, feeling bad about herself, but admits to difficulty concentrating and moving or speaking slowly. She denies any suicidal thoughts. She feels nervous, anxious, or on edge daily, and struggles to control her worry. She has trouble relaxing, being easily annoyed or irritable, and fearing that something awful might happen. She is currently taking Wellbutrin and Effexor. A CaroGenight test was performed, but her insurance

## 2024-10-16 LAB
ESTIMATED AVERAGE GLUCOSE: 111 MG/DL
FOLATE: 18.2 NG/ML (ref 4.8–24.2)
HBA1C MFR BLD: 5.5 % (ref 4–6)
VITAMIN B-12: 822 PG/ML (ref 232–1245)
VITAMIN D 25-HYDROXY: 110 NG/ML (ref 30–100)

## 2024-11-13 ENCOUNTER — OFFICE VISIT (OUTPATIENT)
Dept: FAMILY MEDICINE CLINIC | Age: 54
End: 2024-11-13

## 2024-11-13 VITALS
BODY MASS INDEX: 22.5 KG/M2 | WEIGHT: 140 LBS | SYSTOLIC BLOOD PRESSURE: 90 MMHG | RESPIRATION RATE: 16 BRPM | OXYGEN SATURATION: 96 % | HEIGHT: 66 IN | TEMPERATURE: 97.8 F | DIASTOLIC BLOOD PRESSURE: 58 MMHG | HEART RATE: 84 BPM

## 2024-11-13 DIAGNOSIS — Z87.891 PERSONAL HISTORY OF TOBACCO USE: ICD-10-CM

## 2024-11-13 DIAGNOSIS — R41.840 LACK OF CONCENTRATION: ICD-10-CM

## 2024-11-13 DIAGNOSIS — F41.9 ANXIETY: ICD-10-CM

## 2024-11-13 DIAGNOSIS — A60.00 GENITAL HERPES SIMPLEX, UNSPECIFIED SITE: ICD-10-CM

## 2024-11-13 DIAGNOSIS — F33.1 MODERATE EPISODE OF RECURRENT MAJOR DEPRESSIVE DISORDER (HCC): Primary | ICD-10-CM

## 2024-11-13 RX ORDER — VALACYCLOVIR HYDROCHLORIDE 1 G/1
1000 TABLET, FILM COATED ORAL DAILY
Qty: 90 TABLET | Refills: 0 | Status: SHIPPED | OUTPATIENT
Start: 2024-11-13

## 2024-11-13 RX ORDER — ATOMOXETINE 60 MG/1
60 CAPSULE ORAL DAILY
Qty: 90 CAPSULE | Refills: 0 | Status: SHIPPED | OUTPATIENT
Start: 2024-11-13 | End: 2025-02-11

## 2024-11-13 RX ORDER — BUPROPION HYDROCHLORIDE 300 MG/1
300 TABLET ORAL EVERY MORNING
Qty: 90 TABLET | Refills: 0 | Status: SHIPPED | OUTPATIENT
Start: 2024-11-13

## 2024-11-13 RX ORDER — VENLAFAXINE HYDROCHLORIDE 37.5 MG/1
37.5 CAPSULE, EXTENDED RELEASE ORAL DAILY
Qty: 90 CAPSULE | Refills: 0 | Status: SHIPPED | OUTPATIENT
Start: 2024-11-13

## 2024-11-13 NOTE — PATIENT INSTRUCTIONS
follow-up, he or she will help you understand what to do next.  After a lung cancer screening, you can go back to your usual activities right away.  A lung cancer screening test can't tell if you have lung cancer. If your results are positive, your doctor can't tell whether an abnormal finding is a harmless nodule, cancer, or something else without doing more tests.  What can you do to help prevent lung cancer?  Some lung cancers can't be prevented. But if you smoke, quitting smoking is the best step you can take to prevent lung cancer. If you want to quit, your doctor can recommend medicines or other ways to help.  Follow-up care is a key part of your treatment and safety. Be sure to make and go to all appointments, and call your doctor if you are having problems. It's also a good idea to know your test results and keep a list of the medicines you take.  Where can you learn more?  Go to https://www.eLong.com.net/patientEd and enter Q940 to learn more about \"Learning About Lung Cancer Screening.\"  Current as of: October 25, 2023  Content Version: 14.2  © 2024 [employer].   Care instructions adapted under license by [employer]. If you have questions about a medical condition or this instruction, always ask your healthcare professional. [employer] disclaims any warranty or liability for your use of this information.

## 2024-11-13 NOTE — PROGRESS NOTES
Barbi Katz (:  1970) is a 54 y.o. female, Established patient, here for evaluation of the following chief complaint(s):  Anxiety (Patient is here for a 4 week f/u on Anxiety/Depression. She states she feels the medication is helping but there is room for improvement.) and ADHD (Patient is here for a 4 week f/u on lack of concentration.)          Subjective   History of Present Illness  The patient presents for evaluation of multiple medical concerns.    She reports an improvement in her focus and a reduction in anxiety. She has been able to decrease her Effexor dosage without any adverse effects.     SOCIAL HISTORY  She quit smoking 2 years ago.    Past Medical History:   Diagnosis Date    Depression     Genital herpes      Past Surgical History:   Procedure Laterality Date    BREAST ENHANCEMENT SURGERY      CERVICAL DISCECTOMY      HAND SURGERY Right     plate in her right hand    MT COLON CA SCRN NOT HI RSK IND N/A 2018    COLONOSCOPY performed by Merle Mae MD at [facility]    TUBAL LIGATION       Social History     Socioeconomic History    Marital status:      Spouse name: Yuri Katz    Number of children: Not on file    Years of education: Not on file    Highest education level: Not on file   Occupational History     Employer: [employer]    Tobacco Use    Smoking status: Every Day     Current packs/day: 1.00     Average packs/day: 1 pack/day for 37.9 years (37.9 ttl pk-yrs)     Types: Cigarettes     Start date: 1987    Smokeless tobacco: Never   Vaping Use    Vaping status: Never Used   Substance and Sexual Activity    Alcohol use: No     Comment: occasional    Drug use: No    Sexual activity: Not on file   Other Topics Concern    Not on file   Social History Narrative    Not on file     Social Determinants of Health     Financial Resource Strain: Low Risk  (2023)    Overall Financial Resource Strain (CARDIA)     Difficulty of Paying

## 2024-11-15 ENCOUNTER — PREP FOR PROCEDURE (OUTPATIENT)
Dept: GASTROENTEROLOGY | Age: 54
End: 2024-11-15

## 2024-11-18 ENCOUNTER — TELEPHONE (OUTPATIENT)
Dept: FAMILY MEDICINE CLINIC | Age: 54
End: 2024-11-18

## 2024-11-18 NOTE — TELEPHONE ENCOUNTER
Called and spoke to Barbi letting her know that it's ok for her to go back on the 75 MG of the Venlafaxine. She states she doesn't need a refill right now that she is just taking two of the 37.5 MG.

## 2024-11-18 NOTE — TELEPHONE ENCOUNTER
Patient states Venlafaxine dosage has been decreasing in past visits. Patient is currently prescribed 37.5 mg, but patient does not feel like the dose is effective and she feels off. Patient states she is going back to 75 mg dose. Please call Patient at [phone] if Provider disagrees with increasing dosage back to previous amount.

## 2024-11-19 RX ORDER — SODIUM CHLORIDE 9 MG/ML
INJECTION, SOLUTION INTRAVENOUS CONTINUOUS
Status: CANCELLED | OUTPATIENT
Start: 2024-11-19

## 2024-11-19 RX ORDER — SODIUM CHLORIDE 9 MG/ML
INJECTION, SOLUTION INTRAVENOUS PRN
Status: CANCELLED | OUTPATIENT
Start: 2024-11-19

## 2024-11-19 RX ORDER — SODIUM CHLORIDE 0.9 % (FLUSH) 0.9 %
5-40 SYRINGE (ML) INJECTION EVERY 12 HOURS SCHEDULED
Status: CANCELLED | OUTPATIENT
Start: 2024-11-19

## 2024-11-19 RX ORDER — SODIUM CHLORIDE 0.9 % (FLUSH) 0.9 %
5-40 SYRINGE (ML) INJECTION PRN
Status: CANCELLED | OUTPATIENT
Start: 2024-11-19

## 2024-12-04 SDOH — ECONOMIC STABILITY: FOOD INSECURITY: WITHIN THE PAST 12 MONTHS, YOU WORRIED THAT YOUR FOOD WOULD RUN OUT BEFORE YOU GOT MONEY TO BUY MORE.: NEVER TRUE

## 2024-12-04 SDOH — ECONOMIC STABILITY: INCOME INSECURITY: HOW HARD IS IT FOR YOU TO PAY FOR THE VERY BASICS LIKE FOOD, HOUSING, MEDICAL CARE, AND HEATING?: NOT HARD AT ALL

## 2024-12-04 SDOH — ECONOMIC STABILITY: FOOD INSECURITY: WITHIN THE PAST 12 MONTHS, THE FOOD YOU BOUGHT JUST DIDN'T LAST AND YOU DIDN'T HAVE MONEY TO GET MORE.: NEVER TRUE

## 2024-12-06 ENCOUNTER — OFFICE VISIT (OUTPATIENT)
Dept: FAMILY MEDICINE CLINIC | Age: 54
End: 2024-12-06
Payer: COMMERCIAL

## 2024-12-06 VITALS
TEMPERATURE: 97.8 F | SYSTOLIC BLOOD PRESSURE: 116 MMHG | DIASTOLIC BLOOD PRESSURE: 64 MMHG | HEART RATE: 98 BPM | HEIGHT: 66 IN | WEIGHT: 148 LBS | BODY MASS INDEX: 23.78 KG/M2 | OXYGEN SATURATION: 99 %

## 2024-12-06 DIAGNOSIS — Z12.31 ENCOUNTER FOR SCREENING MAMMOGRAM FOR MALIGNANT NEOPLASM OF BREAST: ICD-10-CM

## 2024-12-06 DIAGNOSIS — F33.1 MODERATE EPISODE OF RECURRENT MAJOR DEPRESSIVE DISORDER (HCC): Primary | ICD-10-CM

## 2024-12-06 PROCEDURE — 99214 OFFICE O/P EST MOD 30 MIN: CPT | Performed by: NURSE PRACTITIONER

## 2024-12-06 RX ORDER — VENLAFAXINE HYDROCHLORIDE 75 MG/1
75 CAPSULE, EXTENDED RELEASE ORAL DAILY
Qty: 90 CAPSULE | Refills: 0 | Status: SHIPPED | OUTPATIENT
Start: 2024-12-06

## 2025-01-09 DIAGNOSIS — F33.1 MODERATE EPISODE OF RECURRENT MAJOR DEPRESSIVE DISORDER (HCC): ICD-10-CM

## 2025-01-09 DIAGNOSIS — F41.9 ANXIETY: ICD-10-CM

## 2025-01-09 RX ORDER — BUPROPION HYDROCHLORIDE 300 MG/1
300 TABLET ORAL EVERY MORNING
Qty: 90 TABLET | Refills: 0 | Status: SHIPPED | OUTPATIENT
Start: 2025-01-09

## 2025-01-16 ENCOUNTER — ANESTHESIA (OUTPATIENT)
Dept: OPERATING ROOM | Age: 55
End: 2025-01-16
Payer: COMMERCIAL

## 2025-01-16 ENCOUNTER — HOSPITAL ENCOUNTER (OUTPATIENT)
Age: 55
Setting detail: OUTPATIENT SURGERY
Discharge: HOME | End: 2025-01-16
Attending: INTERNAL MEDICINE | Admitting: INTERNAL MEDICINE
Payer: COMMERCIAL

## 2025-01-16 ENCOUNTER — ANESTHESIA EVENT (OUTPATIENT)
Dept: OPERATING ROOM | Age: 55
End: 2025-01-16
Payer: COMMERCIAL

## 2025-01-16 VITALS
BODY MASS INDEX: 23.32 KG/M2 | RESPIRATION RATE: 14 BRPM | WEIGHT: 140 LBS | OXYGEN SATURATION: 98 % | HEART RATE: 63 BPM | DIASTOLIC BLOOD PRESSURE: 63 MMHG | TEMPERATURE: 98.6 F | SYSTOLIC BLOOD PRESSURE: 93 MMHG | HEIGHT: 65 IN

## 2025-01-16 PROBLEM — Z86.0100 HISTORY OF COLON POLYPS: Status: ACTIVE | Noted: 2025-01-16

## 2025-01-16 PROCEDURE — 3700000000 HC ANESTHESIA ATTENDED CARE: Performed by: INTERNAL MEDICINE

## 2025-01-16 PROCEDURE — 3609027000 HC COLONOSCOPY: Performed by: INTERNAL MEDICINE

## 2025-01-16 PROCEDURE — 2500000003 HC RX 250 WO HCPCS: Performed by: INTERNAL MEDICINE

## 2025-01-16 PROCEDURE — 2709999900 HC NON-CHARGEABLE SUPPLY: Performed by: INTERNAL MEDICINE

## 2025-01-16 PROCEDURE — 7100000010 HC PHASE II RECOVERY - FIRST 15 MIN: Performed by: INTERNAL MEDICINE

## 2025-01-16 PROCEDURE — 2580000003 HC RX 258: Performed by: INTERNAL MEDICINE

## 2025-01-16 PROCEDURE — 7100000011 HC PHASE II RECOVERY - ADDTL 15 MIN: Performed by: INTERNAL MEDICINE

## 2025-01-16 PROCEDURE — 6360000002 HC RX W HCPCS: Performed by: NURSE ANESTHETIST, CERTIFIED REGISTERED

## 2025-01-16 PROCEDURE — 3700000001 HC ADD 15 MINUTES (ANESTHESIA): Performed by: INTERNAL MEDICINE

## 2025-01-16 RX ORDER — LIDOCAINE HYDROCHLORIDE 20 MG/ML
INJECTION, SOLUTION EPIDURAL; INFILTRATION; INTRACAUDAL; PERINEURAL
Status: DISCONTINUED | OUTPATIENT
Start: 2025-01-16 | End: 2025-01-16 | Stop reason: SDUPTHER

## 2025-01-16 RX ORDER — SODIUM CHLORIDE, SODIUM LACTATE, POTASSIUM CHLORIDE, CALCIUM CHLORIDE 600; 310; 30; 20 MG/100ML; MG/100ML; MG/100ML; MG/100ML
INJECTION, SOLUTION INTRAVENOUS CONTINUOUS
Status: DISCONTINUED | OUTPATIENT
Start: 2025-01-16 | End: 2025-01-16 | Stop reason: HOSPADM

## 2025-01-16 RX ORDER — SODIUM CHLORIDE 9 MG/ML
INJECTION, SOLUTION INTRAVENOUS CONTINUOUS
Status: DISCONTINUED | OUTPATIENT
Start: 2025-01-16 | End: 2025-01-16 | Stop reason: RX

## 2025-01-16 RX ORDER — SODIUM CHLORIDE 0.9 % (FLUSH) 0.9 %
5-40 SYRINGE (ML) INJECTION EVERY 12 HOURS SCHEDULED
Status: DISCONTINUED | OUTPATIENT
Start: 2025-01-16 | End: 2025-01-16 | Stop reason: HOSPADM

## 2025-01-16 RX ORDER — SODIUM CHLORIDE 0.9 % (FLUSH) 0.9 %
5-40 SYRINGE (ML) INJECTION PRN
Status: DISCONTINUED | OUTPATIENT
Start: 2025-01-16 | End: 2025-01-16 | Stop reason: HOSPADM

## 2025-01-16 RX ORDER — SODIUM CHLORIDE 9 MG/ML
INJECTION, SOLUTION INTRAVENOUS PRN
Status: DISCONTINUED | OUTPATIENT
Start: 2025-01-16 | End: 2025-01-16 | Stop reason: HOSPADM

## 2025-01-16 RX ORDER — PROPOFOL 10 MG/ML
INJECTION, EMULSION INTRAVENOUS
Status: DISCONTINUED | OUTPATIENT
Start: 2025-01-16 | End: 2025-01-16 | Stop reason: SDUPTHER

## 2025-01-16 RX ADMIN — PROPOFOL 60 MG: 10 INJECTION, EMULSION INTRAVENOUS at 09:57

## 2025-01-16 RX ADMIN — SODIUM CHLORIDE, SODIUM LACTATE, POTASSIUM CHLORIDE, AND CALCIUM CHLORIDE: .6; .31; .03; .02 INJECTION, SOLUTION INTRAVENOUS at 09:33

## 2025-01-16 RX ADMIN — PROPOFOL 170 MG: 10 INJECTION, EMULSION INTRAVENOUS at 09:40

## 2025-01-16 RX ADMIN — LIDOCAINE HYDROCHLORIDE 50 MG: 20 INJECTION, SOLUTION EPIDURAL; INFILTRATION; INTRACAUDAL; PERINEURAL at 09:40

## 2025-01-16 RX ADMIN — PROPOFOL 50 MG: 10 INJECTION, EMULSION INTRAVENOUS at 09:48

## 2025-01-16 RX ADMIN — PROPOFOL 50 MG: 10 INJECTION, EMULSION INTRAVENOUS at 09:44

## 2025-01-16 RX ADMIN — PROPOFOL 50 MG: 10 INJECTION, EMULSION INTRAVENOUS at 09:52

## 2025-01-16 ASSESSMENT — PAIN SCALES - GENERAL
PAINLEVEL_OUTOF10: 0

## 2025-01-16 ASSESSMENT — PAIN - FUNCTIONAL ASSESSMENT: PAIN_FUNCTIONAL_ASSESSMENT: 0-10

## 2025-01-16 NOTE — ANESTHESIA POSTPROCEDURE EVALUATION
Department of Anesthesiology  Postprocedure Note    Patient: Barbi Katz  MRN: [id number]  YOB: 1970  Date of evaluation: 1/16/2025    Procedure Summary       Date: 01/16/25 Room / Location: [address]    Anesthesia Start: 0939 Anesthesia Stop: 1003    Procedure: COLORECTAL CANCER SCREENING, HIGH RISK Diagnosis:       Family history of colon cancer in mother      (Family history of colon cancer in mother [Z80.0])    Surgeons: Donnie Rayo MD Responsible Provider: Mahogany Maldonado APRN - CRNA    Anesthesia Type: MAC ASA Status: 1            Anesthesia Type: No value filed.    Juan J Phase I: Juan J Score: 10    Juan J Phase II:      Anesthesia Post Evaluation    Patient location during evaluation: PACU  Level of consciousness: awake  Pain score: 0  Airway patency: patent  Nausea & Vomiting: no vomiting and no nausea  Cardiovascular status: hemodynamically stable  Respiratory status: acceptable  Hydration status: stable  Pain management: adequate and satisfactory to patient        No notable events documented.

## 2025-01-16 NOTE — PROGRESS NOTES
Pt admitted to pacu, bed 2. Report received from OR nurse and anesthesia. Pt sleeping upon arrival. VSS. Pulse ox 98% on RA. IV infusing without difficulty. Abd soft, non tender, non distended. Pos BS x 4. Pt passing gas. Pt denies pain.

## 2025-01-16 NOTE — ANESTHESIA PRE PROCEDURE
LIGATION         Social History:    Social History     Tobacco Use    Smoking status: Every Day     Current packs/day: 1.00     Average packs/day: 1 pack/day for 38.0 years (38.0 ttl pk-yrs)     Types: Cigarettes     Start date: 1/1/1987    Smokeless tobacco: Never   Substance Use Topics    Alcohol use: No     Comment: occasional                                Ready to quit: Not Answered  Counseling given: Not Answered      Vital Signs (Current): There were no vitals filed for this visit.                                           BP Readings from Last 3 Encounters:   12/06/24 116/64   11/13/24 (!) 90/58   10/15/24 98/60       NPO Status:                                                                                 BMI:   Wt Readings from Last 3 Encounters:   12/06/24 67.1 kg (148 lb)   11/13/24 63.5 kg (140 lb)   10/15/24 64.9 kg (143 lb)     There is no height or weight on file to calculate BMI.    CBC:   Lab Results   Component Value Date/Time    WBC 6.8 10/15/2024 09:12 AM    RBC 4.02 10/15/2024 09:12 AM    RBC 3.97 09/13/2011 10:25 AM    HGB 13.0 10/15/2024 09:12 AM    HCT 38.1 10/15/2024 09:12 AM    MCV 94.8 10/15/2024 09:12 AM    RDW 13.1 10/15/2024 09:12 AM     10/15/2024 09:12 AM       CMP:   Lab Results   Component Value Date/Time     10/15/2024 09:12 AM    K 4.1 10/15/2024 09:12 AM     10/15/2024 09:12 AM    CO2 25 10/15/2024 09:12 AM    BUN 12 10/15/2024 09:12 AM    CREATININE 0.82 10/15/2024 09:12 AM    GFRAA >60.0 05/26/2021 09:49 AM    LABGLOM 84.9 10/15/2024 09:12 AM    LABGLOM >60.0 11/01/2022 10:38 AM    GLUCOSE 100 10/15/2024 09:12 AM    GLUCOSE 99 09/13/2011 10:25 AM    CALCIUM 9.4 10/15/2024 09:12 AM    BILITOT <0.2 10/15/2024 09:12 AM    ALKPHOS 75 10/15/2024 09:12 AM    AST 28 10/15/2024 09:12 AM    ALT 13 10/15/2024 09:12 AM       POC Tests: No results for input(s): \"POCGLU\", \"POCNA\", \"POCK\", \"POCCL\", \"POCBUN\", \"POCHEMO\", \"POCHCT\" in the last 72 hours.    Coags:   Lab

## 2025-01-16 NOTE — H&P
Patient Name: Barbi Katz  : 1970  MRN: [id number]  DATE: 25      ENDOSCOPY  History and Physical    Procedure:    [x] Diagnostic Colonoscopy       [] Screening Colonoscopy  [] EGD      [] ERCP      [] EUS       [] Other    [x] Previous office notes/History and Physical reviewed from the patients chart. Please see EMR for further details of HPI. I have examined the patient's status immediately prior to the procedure and:      Indications/HPI:    []Abdominal Pain   []Cancer- GI/Lung  []Fhx of colon CA/polyps  []History of Polyps   []Mariscal’s   []Melena  []Abnormal Imaging   []Dysphagia    []Persistent Pneumonia  []Anemia   []Food Impaction  [x]History of Polyps  []GI Bleed   []Pulmonary nodule/Mass  []Change in bowel habits  []Heartburn/Reflux  []Rectal Bleed (BRBPR)  []Chest Pain - Non Cardiac  []Heme (+) Stool  []Ulcers  []Constipation   []Hemoptysis   []Varices  []Diarrhea   []Hypoxemia  []Nausea/Vomiting   []Screening   []Crohns/Colitis  []Other:    Anesthesia:   [x] MAC [] Moderate Sedation   [] General   [] None     ROS: 12 pt Review of Symptoms was negative unless mentioned above    Medications:   Prior to Admission medications    Medication Sig Start Date End Date Taking? Authorizing Provider   buPROPion (WELLBUTRIN XL) 300 MG extended release tablet TAKE 1 TABLET BY MOUTH ONCE DAILY IN THE MORNING 25  Yes Rupert Self MD   venlafaxine (EFFEXOR XR) 75 MG extended release capsule Take 1 capsule by mouth daily 24  Yes Breanna Ramirez APRN - CNP   atomoxetine (STRATTERA) 60 MG capsule Take 1 capsule by mouth daily 24 Yes Breanna Ramirez APRN - CNP   valACYclovir (VALTREX) 1 g tablet Take 1 tablet by mouth daily 24  Yes Breanna Ramirez APRN - CNP   Multiple Vitamins-Minerals (MULTI ADULT GUMMIES PO) Take 1 tablet by mouth daily   Yes ProviderMalcolm MD       Allergies: No Known Allergies     History of allergic reaction to anesthesia:  No    Past Medical

## 2025-02-05 DIAGNOSIS — R41.840 LACK OF CONCENTRATION: ICD-10-CM

## 2025-02-05 RX ORDER — ATOMOXETINE 60 MG/1
60 CAPSULE ORAL DAILY
Qty: 90 CAPSULE | Refills: 0 | Status: SHIPPED | OUTPATIENT
Start: 2025-02-05

## 2025-03-10 ENCOUNTER — OFFICE VISIT (OUTPATIENT)
Age: 55
End: 2025-03-10
Payer: COMMERCIAL

## 2025-03-10 VITALS
WEIGHT: 150 LBS | HEART RATE: 100 BPM | DIASTOLIC BLOOD PRESSURE: 60 MMHG | HEIGHT: 66 IN | BODY MASS INDEX: 24.11 KG/M2 | RESPIRATION RATE: 16 BRPM | OXYGEN SATURATION: 97 % | SYSTOLIC BLOOD PRESSURE: 100 MMHG | TEMPERATURE: 97.3 F

## 2025-03-10 DIAGNOSIS — F41.9 ANXIETY: ICD-10-CM

## 2025-03-10 DIAGNOSIS — F33.1 MODERATE EPISODE OF RECURRENT MAJOR DEPRESSIVE DISORDER (HCC): Primary | ICD-10-CM

## 2025-03-10 DIAGNOSIS — Z87.891 PERSONAL HISTORY OF TOBACCO USE: ICD-10-CM

## 2025-03-10 DIAGNOSIS — Z12.31 ENCOUNTER FOR SCREENING MAMMOGRAM FOR MALIGNANT NEOPLASM OF BREAST: ICD-10-CM

## 2025-03-10 PROCEDURE — 99214 OFFICE O/P EST MOD 30 MIN: CPT | Performed by: NURSE PRACTITIONER

## 2025-03-10 PROCEDURE — G0296 VISIT TO DETERM LDCT ELIG: HCPCS | Performed by: NURSE PRACTITIONER

## 2025-03-10 RX ORDER — BUPROPION HYDROCHLORIDE 150 MG/1
150 TABLET ORAL EVERY MORNING
Qty: 30 TABLET | Refills: 3 | Status: SHIPPED | OUTPATIENT
Start: 2025-03-10

## 2025-03-10 SDOH — ECONOMIC STABILITY: FOOD INSECURITY: WITHIN THE PAST 12 MONTHS, YOU WORRIED THAT YOUR FOOD WOULD RUN OUT BEFORE YOU GOT MONEY TO BUY MORE.: NEVER TRUE

## 2025-03-10 SDOH — ECONOMIC STABILITY: FOOD INSECURITY: WITHIN THE PAST 12 MONTHS, THE FOOD YOU BOUGHT JUST DIDN'T LAST AND YOU DIDN'T HAVE MONEY TO GET MORE.: NEVER TRUE

## 2025-03-10 SDOH — ECONOMIC STABILITY: INCOME INSECURITY: IN THE LAST 12 MONTHS, WAS THERE A TIME WHEN YOU WERE NOT ABLE TO PAY THE MORTGAGE OR RENT ON TIME?: NO

## 2025-03-10 SDOH — ECONOMIC STABILITY: TRANSPORTATION INSECURITY
IN THE PAST 12 MONTHS, HAS THE LACK OF TRANSPORTATION KEPT YOU FROM MEDICAL APPOINTMENTS OR FROM GETTING MEDICATIONS?: NO

## 2025-03-10 ASSESSMENT — PATIENT HEALTH QUESTIONNAIRE - PHQ9
1. LITTLE INTEREST OR PLEASURE IN DOING THINGS: MORE THAN HALF THE DAYS
8. MOVING OR SPEAKING SO SLOWLY THAT OTHER PEOPLE COULD HAVE NOTICED. OR THE OPPOSITE - BEING SO FIDGETY OR RESTLESS THAT YOU HAVE BEEN MOVING AROUND A LOT MORE THAN USUAL: NOT AT ALL
3. TROUBLE FALLING OR STAYING ASLEEP: MORE THAN HALF THE DAYS
8. MOVING OR SPEAKING SO SLOWLY THAT OTHER PEOPLE COULD HAVE NOTICED. OR THE OPPOSITE, BEING SO FIGETY OR RESTLESS THAT YOU HAVE BEEN MOVING AROUND A LOT MORE THAN USUAL: NOT AT ALL
SUM OF ALL RESPONSES TO PHQ QUESTIONS 1-9: 16
2. FEELING DOWN, DEPRESSED OR HOPELESS: MORE THAN HALF THE DAYS
4. FEELING TIRED OR HAVING LITTLE ENERGY: MORE THAN HALF THE DAYS
6. FEELING BAD ABOUT YOURSELF - OR THAT YOU ARE A FAILURE OR HAVE LET YOURSELF OR YOUR FAMILY DOWN: NEARLY EVERY DAY
1. LITTLE INTEREST OR PLEASURE IN DOING THINGS: MORE THAN HALF THE DAYS
SUM OF ALL RESPONSES TO PHQ QUESTIONS 1-9: 16
7. TROUBLE CONCENTRATING ON THINGS, SUCH AS READING THE NEWSPAPER OR WATCHING TELEVISION: NEARLY EVERY DAY
5. POOR APPETITE OR OVEREATING: MORE THAN HALF THE DAYS
SUM OF ALL RESPONSES TO PHQ QUESTIONS 1-9: 16
10. IF YOU CHECKED OFF ANY PROBLEMS, HOW DIFFICULT HAVE THESE PROBLEMS MADE IT FOR YOU TO DO YOUR WORK, TAKE CARE OF THINGS AT HOME, OR GET ALONG WITH OTHER PEOPLE: VERY DIFFICULT
10. IF YOU CHECKED OFF ANY PROBLEMS, HOW DIFFICULT HAVE THESE PROBLEMS MADE IT FOR YOU TO DO YOUR WORK, TAKE CARE OF THINGS AT HOME, OR GET ALONG WITH OTHER PEOPLE: VERY DIFFICULT
9. THOUGHTS THAT YOU WOULD BE BETTER OFF DEAD, OR OF HURTING YOURSELF: NOT AT ALL
5. POOR APPETITE OR OVEREATING: MORE THAN HALF THE DAYS
3. TROUBLE FALLING OR STAYING ASLEEP: MORE THAN HALF THE DAYS
6. FEELING BAD ABOUT YOURSELF - OR THAT YOU ARE A FAILURE OR HAVE LET YOURSELF OR YOUR FAMILY DOWN: NEARLY EVERY DAY
2. FEELING DOWN, DEPRESSED OR HOPELESS: MORE THAN HALF THE DAYS
SUM OF ALL RESPONSES TO PHQ QUESTIONS 1-9: 16
7. TROUBLE CONCENTRATING ON THINGS, SUCH AS READING THE NEWSPAPER OR WATCHING TELEVISION: NEARLY EVERY DAY
4. FEELING TIRED OR HAVING LITTLE ENERGY: MORE THAN HALF THE DAYS
9. THOUGHTS THAT YOU WOULD BE BETTER OFF DEAD, OR OF HURTING YOURSELF: NOT AT ALL
SUM OF ALL RESPONSES TO PHQ QUESTIONS 1-9: 16

## 2025-03-10 NOTE — PROGRESS NOTES
Barbi Katz (:  1970) is a 54 y.o. female, Established patient, here for evaluation of the following chief complaint(s):  Depression (Patient is here for a 3 month f/u on Depression/Anxiety. Patient states her anxiety has been crazy. Patient states now the Strattera isn't doing anything for her. )          Subjective   History of Present Illness  The patient presents for evaluation of anxiety.    She reports a general sense of well-being three months prior, with no significant changes in her life or external stressors. Currently, she experiences an unusual sensation described as a \"bubbly\" feeling in her mind, which she perceives as different but not necessarily negative. This sensation is accompanied by anxiety, leading to a state of overstimulation.    She expresses interest in undergoing a mammogram and Pap smear. She has previously undergone a colonoscopy, which was incomplete, and has been advised to repeat the procedure in five years. However, she was recommended to return for a follow-up the following month, which would have been in 2025, but she was unable to do [place]. She continues to smoke.    SOCIAL HISTORY  The patient admits to smoking.        Past Medical History:   Diagnosis Date    Depression     Genital herpes      Past Surgical History:   Procedure Laterality Date    BREAST ENHANCEMENT SURGERY      CERVICAL DISCECTOMY      COLONOSCOPY N/A 2025    COLORECTAL CANCER SCREENING, HIGH RISK performed by Donnie Rayo MD at [facility] OR    HAND SURGERY Right     plate in her right hand    LA COLON CA SCRN NOT HI RSK IND N/A 2018    COLONOSCOPY performed by Merle Mae MD at [facility]    TUBAL LIGATION       Social History     Socioeconomic History    Marital status:      Spouse name: Yuri Katz    Number of children: Not on file    Years of education: Not on file    Highest education level: Not on file   Occupational

## 2025-03-24 DIAGNOSIS — A60.00 GENITAL HERPES SIMPLEX, UNSPECIFIED SITE: ICD-10-CM

## 2025-03-24 DIAGNOSIS — F33.1 MODERATE EPISODE OF RECURRENT MAJOR DEPRESSIVE DISORDER (HCC): ICD-10-CM

## 2025-03-24 NOTE — TELEPHONE ENCOUNTER
Rx requested:  Requested Prescriptions     Pending Prescriptions Disp Refills    valACYclovir (VALTREX) 1 g tablet 90 tablet 0     Sig: Take 1 tablet by mouth daily    venlafaxine (EFFEXOR XR) 75 MG extended release capsule 90 capsule 0     Sig: Take 1 capsule by mouth daily         Last Office Visit:   3/10/2025      Next Visit Date:  Future Appointments   Date Time Provider Department Center   6/11/2025 10:30 AM Breanna Ramirez APRN - CNP [facility] DE[facility]

## 2025-03-25 RX ORDER — VALACYCLOVIR HYDROCHLORIDE 1 G/1
1000 TABLET, FILM COATED ORAL DAILY
Qty: 90 TABLET | Refills: 0 | Status: SHIPPED | OUTPATIENT
Start: 2025-03-25

## 2025-03-25 RX ORDER — VENLAFAXINE HYDROCHLORIDE 75 MG/1
75 CAPSULE, EXTENDED RELEASE ORAL DAILY
Qty: 90 CAPSULE | Refills: 0 | Status: SHIPPED | OUTPATIENT
Start: 2025-03-25

## 2025-04-05 DIAGNOSIS — F41.9 ANXIETY: ICD-10-CM

## 2025-04-05 DIAGNOSIS — F33.1 MODERATE EPISODE OF RECURRENT MAJOR DEPRESSIVE DISORDER (HCC): ICD-10-CM

## 2025-04-06 RX ORDER — BUPROPION HYDROCHLORIDE 300 MG/1
300 TABLET ORAL EVERY MORNING
Qty: 90 TABLET | Refills: 0 | OUTPATIENT
Start: 2025-04-06

## 2025-05-16 DIAGNOSIS — R41.840 LACK OF CONCENTRATION: ICD-10-CM

## 2025-05-16 RX ORDER — ATOMOXETINE 60 MG/1
60 CAPSULE ORAL DAILY
Qty: 90 CAPSULE | Refills: 0 | Status: SHIPPED | OUTPATIENT
Start: 2025-05-16

## 2025-06-30 DIAGNOSIS — A60.00 GENITAL HERPES SIMPLEX, UNSPECIFIED SITE: ICD-10-CM

## 2025-07-01 RX ORDER — VALACYCLOVIR HYDROCHLORIDE 1 G/1
1000 TABLET, FILM COATED ORAL DAILY
Qty: 30 TABLET | Refills: 0 | Status: SHIPPED | OUTPATIENT
Start: 2025-07-01

## 2025-07-08 RX ORDER — BUPROPION HYDROCHLORIDE 150 MG/1
150 TABLET ORAL EVERY MORNING
Qty: 90 TABLET | Refills: 0 | Status: SHIPPED | OUTPATIENT
Start: 2025-07-08

## 2025-07-18 ENCOUNTER — OFFICE VISIT (OUTPATIENT)
Age: 55
End: 2025-07-18

## 2025-07-18 VITALS
WEIGHT: 152 LBS | DIASTOLIC BLOOD PRESSURE: 60 MMHG | RESPIRATION RATE: 16 BRPM | HEART RATE: 98 BPM | HEIGHT: 66 IN | BODY MASS INDEX: 24.43 KG/M2 | OXYGEN SATURATION: 98 % | TEMPERATURE: 97.5 F | SYSTOLIC BLOOD PRESSURE: 108 MMHG

## 2025-07-18 DIAGNOSIS — F41.9 ANXIETY: Primary | ICD-10-CM

## 2025-07-18 DIAGNOSIS — Z72.0 VAPES NICOTINE CONTAINING SUBSTANCE: ICD-10-CM

## 2025-07-18 DIAGNOSIS — F32.A DEPRESSION, UNSPECIFIED DEPRESSION TYPE: ICD-10-CM

## 2025-07-18 PROCEDURE — 99212 OFFICE O/P EST SF 10 MIN: CPT | Performed by: NURSE PRACTITIONER

## 2025-07-18 PROCEDURE — 99213 OFFICE O/P EST LOW 20 MIN: CPT | Performed by: NURSE PRACTITIONER

## 2025-07-18 RX ORDER — NICOTINE 21 MG/24HR
1 PATCH, TRANSDERMAL 24 HOURS TRANSDERMAL DAILY
Qty: 42 PATCH | Refills: 0 | Status: SHIPPED | OUTPATIENT
Start: 2025-07-18 | End: 2025-08-29

## 2025-07-18 NOTE — PROGRESS NOTES
Barbi Katz (:  1970) is a 54 y.o. female, Established patient, here for evaluation of the following chief complaint(s):  Anxiety & Depression (Patient is here for a 3 month f/u on Anxiety/Depression. She is getting a genesight test done today.) and Nicotine Dependence (Patient would like a printed prescription for nicotine patches. )          Subjective   History of Present Illness  The patient presents for evaluation of anxiety, depression, and nicotine dependence.    She reports that reducing the dosage of Wellbutrin has been beneficial in managing her anxiety. However, she continues to experience intermittent episodes of depression.    She is seeking a prescription for nicotine patches as part of her smoking cessation efforts. She has previously used these patches successfully to quit smoking. Currently, she is using vaping as an alternative to cigarettes, a habit she has maintained for the past 2 to 3 years.    She has not yet scheduled her mammogram due to insurance issues.    Past Medical History:   Diagnosis Date    Depression     Genital herpes      Past Surgical History:   Procedure Laterality Date    BREAST ENHANCEMENT SURGERY      CERVICAL DISCECTOMY      COLONOSCOPY N/A 2025    COLORECTAL CANCER SCREENING, HIGH RISK performed by Donnie Rayo MD at [facility] OR    HAND SURGERY Right     plate in her right hand    CT COLON CA SCRN NOT HI RSK IND N/A 2018    COLONOSCOPY performed by [facility]le [facility] at [facility]    TUBAL LIGATION       Social History     Socioeconomic History    Marital status:      Spouse name: Yuri Katz    Number of children: Not on file    Years of education: Not on file    Highest education level: Not on file   Occupational History     Employer: [employer]    Tobacco Use    Smoking status: Every Day     Current packs/day: 1.00     Average packs/day: 1 pack/day for 38.5 years (38.5 ttl pk-yrs)     Types: Cigarettes

## 2025-07-29 ENCOUNTER — PATIENT MESSAGE (OUTPATIENT)
Age: 55
End: 2025-07-29

## 2025-08-04 DIAGNOSIS — A60.00 GENITAL HERPES SIMPLEX, UNSPECIFIED SITE: ICD-10-CM

## 2025-08-04 DIAGNOSIS — F33.1 MODERATE EPISODE OF RECURRENT MAJOR DEPRESSIVE DISORDER (HCC): ICD-10-CM

## 2025-08-04 RX ORDER — VALACYCLOVIR HYDROCHLORIDE 1 G/1
1000 TABLET, FILM COATED ORAL DAILY
Qty: 90 TABLET | Refills: 1 | Status: SHIPPED | OUTPATIENT
Start: 2025-08-04

## 2025-08-04 RX ORDER — VENLAFAXINE HYDROCHLORIDE 75 MG/1
75 CAPSULE, EXTENDED RELEASE ORAL DAILY
Qty: 90 CAPSULE | Refills: 1 | Status: SHIPPED | OUTPATIENT
Start: 2025-08-04

## (undated) DEVICE — ENDO CARRY-ON PROCEDURE KIT INCLUDES LUBRICANT, DEFENDO OLYMPUS AIR, WATER, SUCTION, BIOPSY VALVE KIT, ENZYMATIC SPONGE, AND BASIN.: Brand: ENDO CARRY-ON PROCEDURE KIT

## (undated) DEVICE — BW-412T DISP COMBO CLEANING BRUSH: Brand: SINGLE USE COMBINATION CLEANING BRUSH

## (undated) DEVICE — 4-PORT MANIFOLD: Brand: NEPTUNE 2

## (undated) DEVICE — TUBE ENDOSCP COLON CHANNEL

## (undated) DEVICE — TUBING IRRIGATION 140/160/180/190 SER GI ENDOSCP SMARTCAP

## (undated) DEVICE — TUBE SET 96 MM 64 MM H2O PERISTALTIC STD AUX CHANNEL

## (undated) DEVICE — MEDI-VAC NON-CONDUCTIVE SUCTION TUBING: Brand: CARDINAL HEALTH

## (undated) DEVICE — ENDOSCOPIC TRAY TRNSPRT 20.5X16.5X4.1 IN RECYCL SUGAR PULP

## (undated) DEVICE — ADAPTER FLSH PMP FLD MGMT GI IRRIG OFP 2 DISPOSABLE